# Patient Record
Sex: MALE | Race: BLACK OR AFRICAN AMERICAN | Employment: OTHER | URBAN - METROPOLITAN AREA
[De-identification: names, ages, dates, MRNs, and addresses within clinical notes are randomized per-mention and may not be internally consistent; named-entity substitution may affect disease eponyms.]

---

## 2017-06-12 RX ORDER — LUBIPROSTONE 8 UG/1
8 CAPSULE, GELATIN COATED ORAL 2 TIMES DAILY WITH MEALS
COMMUNITY
End: 2018-03-22

## 2017-06-12 RX ORDER — MULTIVITAMIN
1 TABLET ORAL DAILY
COMMUNITY
End: 2020-09-21

## 2017-06-12 RX ORDER — CYCLOSPORINE 0.5 MG/ML
1 EMULSION OPHTHALMIC 2 TIMES DAILY
COMMUNITY

## 2017-06-12 RX ORDER — POLYETHYLENE GLYCOL 3350 17 G/17G
17 POWDER, FOR SOLUTION ORAL DAILY
COMMUNITY
End: 2021-11-19 | Stop reason: ALTCHOICE

## 2017-06-19 ENCOUNTER — HOSPITAL ENCOUNTER (OUTPATIENT)
Dept: CT IMAGING | Age: 82
Discharge: HOME | End: 2017-06-19
Attending: PHYSICIAN ASSISTANT
Payer: MEDICARE

## 2017-06-19 DIAGNOSIS — R42 VERTIGO: ICD-10-CM

## 2017-06-19 LAB — CREAT UR-MCNC: 1.4 MG/DL (ref 0.6–1.3)

## 2017-06-19 PROCEDURE — 74011636320 HC RX REV CODE- 636/320: Performed by: RADIOLOGY

## 2017-06-19 PROCEDURE — 70470 CT HEAD/BRAIN W/O & W/DYE: CPT

## 2017-06-19 PROCEDURE — 82565 ASSAY OF CREATININE: CPT

## 2017-06-19 RX ADMIN — IOPAMIDOL 70 ML: 612 INJECTION, SOLUTION INTRAVENOUS at 07:59

## 2017-06-21 ENCOUNTER — ANESTHESIA EVENT (OUTPATIENT)
Dept: ENDOSCOPY | Age: 82
End: 2017-06-21
Payer: MEDICARE

## 2017-06-22 ENCOUNTER — ANESTHESIA (OUTPATIENT)
Dept: ENDOSCOPY | Age: 82
End: 2017-06-22
Payer: MEDICARE

## 2017-06-22 ENCOUNTER — HOSPITAL ENCOUNTER (OUTPATIENT)
Age: 82
Setting detail: OUTPATIENT SURGERY
Discharge: HOME | End: 2017-06-22
Attending: INTERNAL MEDICINE | Admitting: INTERNAL MEDICINE
Payer: MEDICARE

## 2017-06-22 VITALS
OXYGEN SATURATION: 98 % | BODY MASS INDEX: 27.2 KG/M2 | WEIGHT: 190 LBS | TEMPERATURE: 97.1 F | RESPIRATION RATE: 12 BRPM | DIASTOLIC BLOOD PRESSURE: 64 MMHG | HEIGHT: 70 IN | SYSTOLIC BLOOD PRESSURE: 121 MMHG | HEART RATE: 77 BPM

## 2017-06-22 LAB
BUN BLD-MCNC: 7 MG/DL (ref 7–18)
CHLORIDE BLD-SCNC: 103 MMOL/L (ref 100–108)
GLUCOSE BLD STRIP.AUTO-MCNC: 137 MG/DL (ref 70–110)
GLUCOSE BLD STRIP.AUTO-MCNC: 145 MG/DL (ref 74–106)
HCT VFR BLD CALC: 34 % (ref 36–49)
HGB BLD-MCNC: 11.6 G/DL (ref 12–16)
POTASSIUM BLD-SCNC: 3.8 MMOL/L (ref 3.5–5.5)
SODIUM BLD-SCNC: 140 MMOL/L (ref 136–145)

## 2017-06-22 PROCEDURE — 76060000031 HC ANESTHESIA FIRST 0.5 HR: Performed by: INTERNAL MEDICINE

## 2017-06-22 PROCEDURE — 77030018846 HC SOL IRR STRL H20 ICUM -A: Performed by: INTERNAL MEDICINE

## 2017-06-22 PROCEDURE — 77030008565 HC TBNG SUC IRR ERBE -B: Performed by: INTERNAL MEDICINE

## 2017-06-22 PROCEDURE — 74011000250 HC RX REV CODE- 250: Performed by: NURSE ANESTHETIST, CERTIFIED REGISTERED

## 2017-06-22 PROCEDURE — 82962 GLUCOSE BLOOD TEST: CPT

## 2017-06-22 PROCEDURE — 76040000019: Performed by: INTERNAL MEDICINE

## 2017-06-22 PROCEDURE — 74011250636 HC RX REV CODE- 250/636

## 2017-06-22 PROCEDURE — 82947 ASSAY GLUCOSE BLOOD QUANT: CPT

## 2017-06-22 PROCEDURE — 74011250636 HC RX REV CODE- 250/636: Performed by: NURSE ANESTHETIST, CERTIFIED REGISTERED

## 2017-06-22 RX ORDER — PROPOFOL 10 MG/ML
INJECTION, EMULSION INTRAVENOUS
Status: DISCONTINUED | OUTPATIENT
Start: 2017-06-22 | End: 2017-06-22 | Stop reason: HOSPADM

## 2017-06-22 RX ORDER — NALOXONE HYDROCHLORIDE 0.4 MG/ML
0.1 INJECTION, SOLUTION INTRAMUSCULAR; INTRAVENOUS; SUBCUTANEOUS AS NEEDED
Status: CANCELLED | OUTPATIENT
Start: 2017-06-22

## 2017-06-22 RX ORDER — SODIUM CHLORIDE, SODIUM LACTATE, POTASSIUM CHLORIDE, CALCIUM CHLORIDE 600; 310; 30; 20 MG/100ML; MG/100ML; MG/100ML; MG/100ML
50 INJECTION, SOLUTION INTRAVENOUS CONTINUOUS
Status: DISCONTINUED | OUTPATIENT
Start: 2017-06-22 | End: 2017-06-22 | Stop reason: HOSPADM

## 2017-06-22 RX ORDER — INSULIN LISPRO 100 [IU]/ML
INJECTION, SOLUTION INTRAVENOUS; SUBCUTANEOUS ONCE
Status: CANCELLED | OUTPATIENT
Start: 2017-06-22 | End: 2017-06-22

## 2017-06-22 RX ORDER — SODIUM CHLORIDE 0.9 % (FLUSH) 0.9 %
5-10 SYRINGE (ML) INJECTION AS NEEDED
Status: CANCELLED | OUTPATIENT
Start: 2017-06-22

## 2017-06-22 RX ORDER — DEXTROSE 50 % IN WATER (D50W) INTRAVENOUS SYRINGE
25-50 AS NEEDED
Status: CANCELLED | OUTPATIENT
Start: 2017-06-22

## 2017-06-22 RX ORDER — PROPOFOL 10 MG/ML
INJECTION, EMULSION INTRAVENOUS AS NEEDED
Status: DISCONTINUED | OUTPATIENT
Start: 2017-06-22 | End: 2017-06-22 | Stop reason: HOSPADM

## 2017-06-22 RX ORDER — ONDANSETRON 2 MG/ML
4 INJECTION INTRAMUSCULAR; INTRAVENOUS
Status: CANCELLED | OUTPATIENT
Start: 2017-06-22

## 2017-06-22 RX ORDER — LIDOCAINE HYDROCHLORIDE 10 MG/ML
0.1 INJECTION, SOLUTION EPIDURAL; INFILTRATION; INTRACAUDAL; PERINEURAL AS NEEDED
Status: DISCONTINUED | OUTPATIENT
Start: 2017-06-22 | End: 2017-06-22 | Stop reason: HOSPADM

## 2017-06-22 RX ORDER — SODIUM CHLORIDE, SODIUM LACTATE, POTASSIUM CHLORIDE, CALCIUM CHLORIDE 600; 310; 30; 20 MG/100ML; MG/100ML; MG/100ML; MG/100ML
100 INJECTION, SOLUTION INTRAVENOUS CONTINUOUS
Status: CANCELLED | OUTPATIENT
Start: 2017-06-22

## 2017-06-22 RX ORDER — MAGNESIUM SULFATE 100 %
4 CRYSTALS MISCELLANEOUS AS NEEDED
Status: CANCELLED | OUTPATIENT
Start: 2017-06-22

## 2017-06-22 RX ADMIN — PROPOFOL 75 MCG/KG/MIN: 10 INJECTION, EMULSION INTRAVENOUS at 08:21

## 2017-06-22 RX ADMIN — SODIUM CHLORIDE, SODIUM LACTATE, POTASSIUM CHLORIDE, AND CALCIUM CHLORIDE 50 ML/HR: 600; 310; 30; 20 INJECTION, SOLUTION INTRAVENOUS at 07:48

## 2017-06-22 RX ADMIN — PROPOFOL 20 MG: 10 INJECTION, EMULSION INTRAVENOUS at 08:19

## 2017-06-22 RX ADMIN — FAMOTIDINE 20 MG: 10 INJECTION, SOLUTION INTRAVENOUS at 07:56

## 2017-06-22 RX ADMIN — PROPOFOL 50 MG: 10 INJECTION, EMULSION INTRAVENOUS at 08:17

## 2017-06-22 NOTE — IP AVS SNAPSHOT
[address] [address] 
[phone] Patient: Rufina Viveros MRN: [id number] :3/26/1927 You are allergic to the following No active allergies Recent Documentation Height Weight BMI Smoking Status 1.778 m 86.2 kg 27.26 kg/m2 Former Smoker Emergency Contacts Name Discharge Info Relation Home Work Mobile Zuleyma Koehler DISCHARGE CAREGIVER [3] Spouse [3] [phone] About your hospitalization You were admitted on:  2017 You last received care in the:  [facility] PACU You were discharged on:  2017 Unit phone number:  [phone] Why you were hospitalized Your primary diagnosis was:  Not on File Providers Seen During Your Hospitalizations Provider Role Specialty Primary office phone Jesus Castillo MD Attending Provider Gastroenterology [phone] Your Primary Care Physician (PCP) Primary Care Physician Office Phone Office Fax [phone] [facility] [place], [address]  [phone] Follow-up Information Follow up With Details Comments Contact Info ARIANA Sol   [address] [place] [address] [address] [facility] [address] 
[phone] Jesus Castillo MD  Follow up as needed 8 [facility] 200 200 [facility] 
[phone] Your Appointments 2017  9:10 AM EDT Nurse Visit with [facility] NURSE [facility] ([facility]) Viridiana Michaels [address] Luma Gonzalez  
[phone] Current Discharge Medication List  
  
CONTINUE these medications which have NOT CHANGED Dose & Instructions Dispensing Information Comments Morning Noon Evening Bedtime AMITIZA 8 mcg capsule Generic drug:  lubiPROStone Your last dose was: Your next dose is:    
   
   
 Dose:  8 mcg Take 8 mcg by mouth two (2) times daily (with meals). Refills:  0  
     
   
   
   
  
 aspirin 325 mg tablet Commonly known as:  ASPIRIN Your last dose was: Your next dose is:    
   
   
 Dose:  325 mg Take 325 mg by mouth daily. Refills:  0  
     
   
   
   
  
 BIOFLEX 095-53-06-77 mg Tab Generic drug:  vit [id number] Your last dose was: Your next dose is: Take  by mouth. Refills:  0 CINNAMON 500 mg Cap Generic drug:  cinnamon bark Your last dose was: Your next dose is:    
   
   
 Dose:  1 Tab Take 1 Tab by mouth daily. Refills:  0 MIRALAX 17 gram/dose powder Generic drug:  polyethylene glycol Your last dose was: Your next dose is:    
   
   
 Dose:  17 g Take 17 g by mouth daily. Refills:  0  
     
   
   
   
  
 multivitamin tablet Commonly known as:  ONE A DAY Your last dose was: Your next dose is:    
   
   
 Dose:  1 Tab Take 1 Tab by mouth daily. Refills:  0  
     
   
   
   
  
 pravastatin 40 mg tablet Commonly known as:  PRAVACHOL Your last dose was: Your next dose is:    
   
   
 Dose:  40 mg Take 40 mg by mouth nightly. Refills:  0  
     
   
   
   
  
 psyllium packet Commonly known as:  METAMUCIL Your last dose was: Your next dose is:    
   
   
 Dose:  1 Packet Take 1 Packet by mouth daily. Refills:  0  
     
   
   
   
  
 raNITIdine hcl 150 mg capsule Your last dose was: Your next dose is:    
   
   
 Dose:  150 mg Take 150 mg by mouth two (2) times a day. Refills:  0  
     
   
   
   
  
 RESTASIS 0.05 % ophthalmic emulsion Generic drug:  cycloSPORINE Your last dose was: Your next dose is:    
   
   
 Dose:  1 Drop Administer 1 Drop to both eyes two (2) times a day. Refills:  0 TYLENOL ARTHRITIS PO Your last dose was: Your next dose is:    
   
   
 Dose:  2 Tab Take 2 Tabs by mouth daily. Refills:  0  
     
   
   
   
  
 VITAMIN C PO Your last dose was: Your next dose is: Take  by mouth. Refills:  0  
     
   
   
   
  
 VITAMIN D3 1,000 unit Cap Generic drug:  cholecalciferol Your last dose was: Your next dose is: Take  by mouth. Refills:  0 Vytorin 10/80 10-80 mg per tablet Generic drug:  ezetimibe-simvastatin Your last dose was: Your next dose is:    
   
   
 Dose:  1 Tab Take 1 Tab by mouth nightly. Refills:  0 Discharge Instructions Colonoscopy: What to Expect at [facility] Your Recovery After you have a colonoscopy, you will stay at the clinic for 1 to 2 hours until the medicines wear off. Then you can go home. But you will need to arrange for a ride. Your doctor will tell you when you can eat and do your other usual activities. Your doctor will talk to you about when you will need your next colonoscopy. Your doctor can help you decide how often you need to be checked. This will depend on the results of your test and your risk for colorectal cancer. After the test, you may be bloated or have gas pains. You may need to pass gas. If a biopsy was done or a polyp was removed, you may have streaks of blood in your stool (feces) for a few days. This care sheet gives you a general idea about how long it will take for you to recover. But each person recovers at a different pace. Follow the steps below to get better as quickly as possible. How can you care for yourself at home? Activity · Rest when you feel tired. · You can do your normal activities when it feels okay to do so. Diet · Follow your doctor's directions for eating. · Unless your doctor has told you not to, drink plenty of fluids.  This helps to replace the fluids that were lost during the colon prep. · Do not drink alcohol. Medicines · Your doctor will tell you if and when you can restart your medicines. He or she will also give you instructions about taking any new medicines. · If you take blood thinners, such as warfarin (Coumadin), clopidogrel (Plavix), or aspirin, be sure to talk to your doctor. He or she will tell you if and when to start taking those medicines again. Make sure that you understand exactly what your doctor wants you to do. · If polyps were removed or a biopsy was done during the test, your doctor may tell you not to take aspirin or other anti-inflammatory medicines for a few days. These include ibuprofen (Advil, Motrin) and naproxen (Aleve). Other instructions · For your safety, do not drive or operate machinery until the medicine wears off and you can think clearly. Your doctor may tell you not to drive or operate machinery until the day after your test. 
· Do not sign legal documents or make major decisions until the medicine wears off and you can think clearly. The anesthesia can make it hard for you to fully understand what you are agreeing to. Follow-up care is a key part of your treatment and safety. Be sure to make and go to all appointments, and call your doctor if you are having problems. It's also a good idea to know your test results and keep a list of the medicines you take. When should you call for help? Call 911 anytime you think you may need emergency care. For example, call if: 
· You passed out (lost consciousness). · You pass maroon or bloody stools. · You have severe belly pain. Call your doctor now or seek immediate medical care if: 
· Your stools are black and tarlike. · Your stools have streaks of blood, but you did not have a biopsy or any polyps removed. · You have belly pain, or your belly is swollen and firm. · You vomit. · You have a fever. · You are very dizzy. Watch closely for changes in your health, and be sure to contact your doctor if you have any problems. Where can you learn more? Go to http://leeanne-adelaide.info/. Enter E264 in the search box to learn more about \"Colonoscopy: What to Expect at Home. \" Current as of: August 9, 2016 Content Version: 11.3 © 7371-2414 [employer]. Care instructions adapted under license by [employer] (which disclaims liability or warranty for this information). If you have questions about a medical condition or this instruction, always ask your healthcare professional. [address] [address] any warranty or liability for your use of this information. Learning About Diverticulosis and Diverticulitis What are diverticulosis and diverticulitis? In diverticulosis and diverticulitis, pouches called diverticula form in the wall of the large intestine, or colon. · In diverticulosis, the pouches do not cause any pain or other symptoms. · In diverticulitis, the pouches get inflamed or infected and cause symptoms. Doctors aren't sure what causes these pouches in the colon. But they think that a low-fiber diet may play a role. Without fiber to add bulk to the stool, the colon has to work harder than normal to push the stool forward. The pressure from this may cause pouches to form in weak spots along the colon. Some people with diverticulosis get diverticulitis. But experts don't know why this happens. What are the symptoms? · In diverticulosis, most people don't have symptoms. But pouches sometimes bleed. · In diverticulitis, symptoms may last from a few hours to a week or more. They include: ¨ Belly pain. This is usually in the lower left side. It is sometimes worse when you move. This is the most common symptom. ¨ Fever and chills. ¨ Bloating and gas. ¨ Diarrhea or constipation. ¨ Nausea and sometimes vomiting. ¨ Not feeling like eating. How can you prevent these problems? You may be able to lower your chance of getting diverticulitis. You can do this by taking steps to prevent constipation. · Eat fruits, vegetables, beans, and whole grains every day. These foods are high in fiber. · Drink plenty of fluids (enough so that your urine is light yellow or clear like water). If you have kidney, heart, or liver disease and have to limit fluids, talk with your doctor before you increase the amount of fluids you drink. · Get at least 30 minutes of exercise on most days of the week. Walking is a good choice. You also may want to do other activities, such as running, swimming, cycling, or playing tennis or team sports. · Take a fiber supplement, such as Citrucel or Metamucil, every day if needed. Read and follow all instructions on the label. · Schedule time each day for a bowel movement. Having a daily routine may help. Take your time and do not strain when having a bowel movement. Some people avoid nuts, seeds, berries, and popcorn. They believe that these foods might get trapped in the diverticula and cause pain. But there is no proof that these foods cause diverticulitis or make it worse. How are these problems treated? · The best way to treat diverticulosis is to avoid constipation. (See the tips above.) · Treatment for diverticulitis includes antibiotics and often a change in your diet. You may need only liquids at first. Your doctor may suggest pain medicines for pain or belly cramps. In some cases, surgery may be needed. Follow-up care is a key part of your treatment and safety. Be sure to make and go to all appointments, and call your doctor if you are having problems. It's also a good idea to know your test results and keep a list of the medicines you take. Where can you learn more? Go to http://leeanne-adelaide.info/. Enter G665 in the search box to learn more about \"Learning About Diverticulosis and Diverticulitis. \" 
 Current as of: August 9, 2016 Content Version: 11.3 © 3357-3465 [employer]. Care instructions adapted under license by logolineup (which disclaims liability or warranty for this information). If you have questions about a medical condition or this instruction, always ask your healthcare professional. [address] any warranty or liability for your use of this information. DISCHARGE SUMMARY from Nurse The following personal items are in your possession at time of discharge: 
 
Dental Appliances: Uppers Visual Aid: Glasses PATIENT INSTRUCTIONS: 
 
 
F-face looks uneven A-arms unable to move or move unevenly S-speech slurred or non-existent T-time-call 911 as soon as signs and symptoms begin-DO NOT go Back to bed or wait to see if you get better-TIME IS BRAIN. Warning Signs of HEART ATTACK Call 911 if you have these symptoms: 
? Chest discomfort. Most heart attacks involve discomfort in the center of the chest that lasts more than a few minutes, or that goes away and comes back. It can feel like uncomfortable pressure, squeezing, fullness, or pain. ? Discomfort in other areas of the upper body. Symptoms can include pain or discomfort in one or both arms, the back, neck, jaw, or stomach. ? Shortness of breath with or without chest discomfort. ? Other signs may include breaking out in a cold sweat, nausea, or lightheadedness. Don't wait more than five minutes to call [address]! Fast action can save your life. Calling 911 is almost always the fastest way to get lifesaving treatment. Emergency Medical Services staff can begin treatment when they arrive  up to an hour sooner than if someone gets to the hospital by car. The discharge information has been reviewed with the patient and spouse. The patient and spouse verbalized understanding. Discharge medications reviewed with the patient and spouse and appropriate educational materials and side effects teaching were provided. Discharge Orders None Introducing [facility]! Dear Eusebio Hickman: Thank you for requesting a [employer] account. Our records indicate that you already have an active [employer] account. You can access your account anytime at [email or web address] Did you know that you can access your hospital and ER discharge instructions at any time in [employer]? You can also review all of your test results from your hospital stay or ER visit. Additional Information If you have questions, please visit the Frequently Asked Questions section of the [employer] website at [email or web address] Remember, [employer] is NOT to be used for urgent needs. For medical emergencies, dial 911. Now available from your iPhone and Android! General Information Please provide this summary of care documentation to your next provider. Patient Signature:  ____________________________________________________________ Date:  ____________________________________________________________  
  
Gabriele Zaldivar Provider Signature:  ____________________________________________________________ Date:  ____________________________________________________________

## 2017-06-22 NOTE — ANESTHESIA POSTPROCEDURE EVALUATION
Post-Anesthesia Evaluation and Assessment    Patient: Frantz Valencia MRN: [id number]  SSN: [id number]    YOB: 1927  Age: 80 y.o. Sex: male       Cardiovascular Function/Vital Signs  Visit Vitals    /56    Pulse 77    Temp 36.4 °C (97.6 °F)    Resp 15    Ht 5' 10\" (1.778 m)    Wt 86.2 kg (190 lb)    SpO2 99%    BMI 27.26 kg/m2       Patient is status post MAC anesthesia for Procedure(s):  COLONOSCOPY. Nausea/Vomiting: None    Postoperative hydration reviewed and adequate. Pain:  Pain Scale 1: Numeric (0 - 10) (06/22/17 0844)  Pain Intensity 1: 0 (06/22/17 0844)   Managed    Neurological Status: At baseline    Mental Status and Level of Consciousness: Arousable    Pulmonary Status:   O2 Device: Room air (06/22/17 0844)   Adequate oxygenation and airway patent    Complications related to anesthesia: None    Post-anesthesia assessment completed.  No concerns    Signed By: Val Rico MD     June 22, 2017

## 2017-06-22 NOTE — H&P
[facility]   [email or web address]      Impression:   1.blood in stool   2. Hx of polyps       Plan:     1. [place] mac all risks discussed       Chief Complaint: blod in stool       HPI:  Almaraz Meals is a 80 y.o. male who is being seen on consult for blood in stool and polyps   PMH:   Past Medical History:   Diagnosis Date    Diabetes ([facility] [address]     no meds    Hypercholesteremia     Personal history of malignant neoplasm of prostate     Prostate cancer ([facility] [address]     Unspecified cerebral artery occlusion with cerebral infarction 2012    \"TIA\"       PSH:   Past Surgical History:   Procedure Laterality Date    HX COLONOSCOPY      HX CRYOABLATION OF THE PROSTATE         Social HX:   Social History     Social History    Marital status:      Spouse name: N/A    Number of children: N/A    Years of education: N/A     Occupational History    Not on file. Social History Main Topics    Smoking status: Former Smoker    Smokeless tobacco: Never Used    Alcohol use No    Drug use: No    Sexual activity: Not on file     Other Topics Concern    Not on file     Social History Narrative       FHX:   History reviewed. No pertinent family history. Allergy:   No Known Allergies    Home Medications:     Prescriptions Prior to Admission   Medication Sig    lubiPROStone (AMITIZA) 8 mcg capsule Take 8 mcg by mouth two (2) times daily (with meals).  psyllium (METAMUCIL) packet Take 1 Packet by mouth daily.  polyethylene glycol (MIRALAX) 17 gram/dose powder Take 17 g by mouth daily.  cycloSPORINE (RESTASIS) 0.05 % ophthalmic emulsion Administer 1 Drop to both eyes two (2) times a day.  cinnamon bark (CINNAMON) 500 mg cap Take 1 Tab by mouth daily.  Cholecalciferol, Vitamin D3, (VITAMIN D3) 1,000 unit Cap Take  by mouth.  vit z-gajswfk-tdgt-rutin-hb111 (BIOFLEX) 538-71-38-53 mg Tab Take  by mouth.       ACETAMINOPHEN (TYLENOL ARTHRITIS PO) Take 2 Tabs by mouth daily.  aspirin (ASPIRIN) 325 mg tablet Take 325 mg by mouth daily.  multivitamin (ONE A DAY) tablet Take 1 Tab by mouth daily.  ASCORBATE CALCIUM (VITAMIN C PO) Take  by mouth.  pravastatin (PRAVACHOL) 40 mg tablet Take 40 mg by mouth nightly.  ranitidine hcl 150 mg capsule Take 150 mg by mouth two (2) times a day.  ezetimibe-simvastatin (VYTORIN 10/80) 10-80 mg per tablet Take 1 Tab by mouth nightly. Review of Systems:     Constitutional: No fevers, chills, weight loss, fatigue. Skin: No rashes, pruritis, jaundice, ulcerations, erythema. HENT: No headaches, nosebleeds, sinus pressure, rhinorrhea, sore throat. Eyes: No visual changes, blurred vision, eye pain, photophobia, jaundice. Cardiovascular: No chest pain, heart palpitations. Respiratory: No cough, SOB, wheezing, chest discomfort, orthopnea. Gastrointestinal: No pain or bleeding    Genitourinary: No dysuria, bleeding, discharge, pyuria. Musculoskeletal: No weakness, arthralgias, wasting. Endo: No sweats. Heme: No bruising, easy bleeding. Allergies: As noted. Neurological: Cranial nerves intact. Alert and oriented. Gait not assessed. Psychiatric:  No anxiety, depression, hallucinations. Visit Vitals    Ht 5' 10\" (1.778 m)    Wt 86.2 kg (190 lb)    BMI 27.26 kg/m2       Physical Assessment:     constitutional: appearance: well developed, well nourished, normal habitus, no deformities, in no acute distress. skin: inspection: no rashes, ulcers, icterus or other lesions; no clubbing or telangiectasias. palpation: no induration or subcutaneos nodules. eyes: inspection: normal conjunctivae and lids; no jaundice pupils: symmetrical, normoreactive to light, normal accommodation and size. ENMT: mouth: normal oral mucosa,lips and gums; good dentition. oropharynx: normal tongue, hard and soft palate; posterior pharynx without erythema, exudate or lesions.    neck: no masses organomegaly or tenderness. respiratory: effort: normal chest excursion; no intercostal retraction or accessory muscle use. cardiovascular: abdominal aorta: normal size and position; no bruits. palpation: PMI of normal size and position; normal rhythm; no thrill or murmurs. abdominal: abdomen: normal consistency; no tenderness or masses. hernias: no hernias appreciated. liver: normal size and consistency. spleen: not palpable. rectal: hemoccult/guaiac: not performed. musculoskeletal: no deformities or muscle wasting   lymphatic: axilae: not palpable. groin: not palpable. neck: within normal limits. other: not palpable. neurologic: cranial nerves: II-XII normal.   psychiatric: judgement/insight: within normal limits. memory: within normal limits for recent and remote events. mood and affect: no evidence of depression, anxiety or agitation. orientation: oriented to time, space and person. Basic Metabolic Profile   No results for input(s): NA, K, CL, CO2, BUN, GLU, CA, MG, PHOS in the last 72 hours. No lab exists for component: CREAT      CBC w/Diff    No results for input(s): WBC, RBC, HGB, HCT, MCV, MCH, MCHC, RDW, PLT, HGBEXT, HCTEXT, PLTEXT in the last 72 hours. No lab exists for component: MPV No results for input(s): GRANS, LYMPH, EOS, PRO, MYELO, METAS, BLAST in the last 72 hours. No lab exists for component: MONO, BASO     Hepatic Function   No results for input(s): ALB, TP, TBILI, GPT, SGOT, AP, AML, LPSE in the last 72 hours. No lab exists for component: Madai Shankar MD, M.D. [facility], [address] [place] [address]  [email or web address]

## 2017-06-22 NOTE — ANESTHESIA PREPROCEDURE EVALUATION
Anesthetic History   No history of anesthetic complications            Review of Systems / Medical History  Patient summary reviewed and pertinent labs reviewed    Pulmonary  Within defined limits                 Neuro/Psych         TIA     Cardiovascular                  Exercise tolerance: <4 METS: Uses a cane     GI/Hepatic/Renal                Endo/Other    Diabetes (On no meds)    Arthritis and cancer (Prostate)     Other Findings   Comments:   Risk Factors for Postoperative nausea/vomiting:       History of postoperative nausea/vomiting? NO       Female? NO       Motion sickness? NO       Intended opioid administration for postoperative analgesia? NO      Smoking Abstinence  Current Smoker? NO  Elective Surgery? YES  Seen preoperatively by anesthesiologist or proxy prior to day of surgery? YES  Pt abstained from smoking 24 hours prior to anesthesia?  N/A           Physical Exam    Airway  Mallampati: II  TM Distance: 4 - 6 cm  Neck ROM: normal range of motion   Mouth opening: Normal     Cardiovascular  Regular rate and rhythm,  S1 and S2 normal,  no murmur, click, rub, or gallop             Dental    Dentition: Edentulous     Pulmonary  Breath sounds clear to auscultation               Abdominal  GI exam deferred       Other Findings            Anesthetic Plan    ASA: 3  Anesthesia type: MAC          Induction: Intravenous  Anesthetic plan and risks discussed with: Patient

## 2017-06-22 NOTE — DISCHARGE INSTRUCTIONS
Colonoscopy: What to Expect at [address] [place] [address]  After you have a colonoscopy, you will stay at the clinic for 1 to 2 hours until the medicines wear off. Then you can go home. But you will need to arrange for a ride. Your doctor will tell you when you can eat and do your other usual activities. Your doctor will talk to you about when you will need your next colonoscopy. Your doctor can help you decide how often you need to be checked. This will depend on the results of your test and your risk for colorectal cancer. After the test, you may be bloated or have gas pains. You may need to pass gas. If a biopsy was done or a polyp was removed, you may have streaks of blood in your stool (feces) for a few days. This care sheet gives you a general idea about how long it will take for you to recover. But each person recovers at a different pace. Follow the steps below to get better as quickly as possible. How can you care for yourself at home? Activity  · Rest when you feel tired. · You can do your normal activities when it feels okay to do so. Diet  · Follow your doctor's directions for eating. · Unless your doctor has told you not to, drink plenty of fluids. This helps to replace the fluids that were lost during the colon prep. · Do not drink alcohol. Medicines  · Your doctor will tell you if and when you can restart your medicines. He or she will also give you instructions about taking any new medicines. · If you take blood thinners, such as warfarin (Coumadin), clopidogrel (Plavix), or aspirin, be sure to talk to your doctor. He or she will tell you if and when to start taking those medicines again. Make sure that you understand exactly what your doctor wants you to do. · If polyps were removed or a biopsy was done during the test, your doctor may tell you not to take aspirin or other anti-inflammatory medicines for a few days. These include ibuprofen (Advil, Motrin) and naproxen (Aleve).   Other instructions  · For your safety, do not drive or operate machinery until the medicine wears off and you can think clearly. Your doctor may tell you not to drive or operate machinery until the day after your test.  · Do not sign legal documents or make major decisions until the medicine wears off and you can think clearly. The anesthesia can make it hard for you to fully understand what you are agreeing to. Follow-up care is a key part of your treatment and safety. Be sure to make and go to all appointments, and call your doctor if you are having problems. It's also a good idea to know your test results and keep a list of the medicines you take. When should you call for help? Call 911 anytime you think you may need emergency care. For example, call if:  · You passed out (lost consciousness). · You pass maroon or bloody stools. · You have severe belly pain. Call your doctor now or seek immediate medical care if:  · Your stools are black and tarlike. · Your stools have streaks of blood, but you did not have a biopsy or any polyps removed. · You have belly pain, or your belly is swollen and firm. · You vomit. · You have a fever. · You are very dizzy. Watch closely for changes in your health, and be sure to contact your doctor if you have any problems. Where can you learn more? Go to http://leeanne-adelaide.info/. Enter E264 in the search box to learn more about \"Colonoscopy: What to Expect at Home. \"  Current as of: August 9, 2016  Content Version: 11.3  © 5275-9734 [employer]. Care instructions adapted under license by [employer] (which disclaims liability or warranty for this information). If you have questions about a medical condition or this instruction, always ask your healthcare professional. [address] any warranty or liability for your use of this information.   Learning About Diverticulosis and Diverticulitis  What are diverticulosis and diverticulitis? In diverticulosis and diverticulitis, pouches called diverticula form in the wall of the large intestine, or colon. · In diverticulosis, the pouches do not cause any pain or other symptoms. · In diverticulitis, the pouches get inflamed or infected and cause symptoms. Doctors aren't sure what causes these pouches in the colon. But they think that a low-fiber diet may play a role. Without fiber to add bulk to the stool, the colon has to work harder than normal to push the stool forward. The pressure from this may cause pouches to form in weak spots along the colon. Some people with diverticulosis get diverticulitis. But experts don't know why this happens. What are the symptoms? · In diverticulosis, most people don't have symptoms. But pouches sometimes bleed. · In diverticulitis, symptoms may last from a few hours to a week or more. They include:  ¨ Belly pain. This is usually in the lower left side. It is sometimes worse when you move. This is the most common symptom. ¨ Fever and chills. ¨ Bloating and gas. ¨ Diarrhea or constipation. ¨ Nausea and sometimes vomiting. ¨ Not feeling like eating. How can you prevent these problems? You may be able to lower your chance of getting diverticulitis. You can do this by taking steps to prevent constipation. · Eat fruits, vegetables, beans, and whole grains every day. These foods are high in fiber. · Drink plenty of fluids (enough so that your urine is light yellow or clear like water). If you have kidney, heart, or liver disease and have to limit fluids, talk with your doctor before you increase the amount of fluids you drink. · Get at least 30 minutes of exercise on most days of the week. Walking is a good choice. You also may want to do other activities, such as running, swimming, cycling, or playing tennis or team sports. · Take a fiber supplement, such as Citrucel or Metamucil, every day if needed.  Read and follow all instructions on the label. · Schedule time each day for a bowel movement. Having a daily routine may help. Take your time and do not strain when having a bowel movement. Some people avoid nuts, seeds, berries, and popcorn. They believe that these foods might get trapped in the diverticula and cause pain. But there is no proof that these foods cause diverticulitis or make it worse. How are these problems treated? · The best way to treat diverticulosis is to avoid constipation. (See the tips above.)  · Treatment for diverticulitis includes antibiotics and often a change in your diet. You may need only liquids at first. Your doctor may suggest pain medicines for pain or belly cramps. In some cases, surgery may be needed. Follow-up care is a key part of your treatment and safety. Be sure to make and go to all appointments, and call your doctor if you are having problems. It's also a good idea to know your test results and keep a list of the medicines you take. Where can you learn more? Go to http://leeanne-adelaide.info/. Enter L695 in the search box to learn more about \"Learning About Diverticulosis and Diverticulitis. \"  Current as of: August 9, 2016  Content Version: 11.3  © 4327-5552 [employer]. Care instructions adapted under license by [employer] (which disclaims liability or warranty for this information). If you have questions about a medical condition or this instruction, always ask your healthcare professional. [place] [address] any warranty or liability for your use of this information.     DISCHARGE SUMMARY from Nurse    The following personal items are in your possession at time of discharge:    Dental Appliances: Uppers  Visual Aid: Glasses                  PATIENT INSTRUCTIONS:    After general anesthesia or intravenous sedation, for 24 hours or while taking prescription Narcotics:  · Limit your activities  · Do not drive and operate hazardous machinery  · Do not make important personal or business decisions  · Do  not drink alcoholic beverages  · If you have not urinated within 8 hours after discharge, please contact your surgeon on call. Report the following to your surgeon:  · Excessive pain, swelling, redness or odor of or around the surgical area  · Temperature over 100.5  · Nausea and vomiting lasting longer than 4 hours or if unable to take medications  · Any signs of decreased circulation or nerve impairment to extremity: change in color, persistent  numbness, tingling, coldness or increase pain  · Any questions    *  Please give a list of your current medications to your Primary Care Provider. *  Please update this list whenever your medications are discontinued, doses are      changed, or new medications (including over-the-counter products) are added. *  Please carry medication information at all times in case of emergency situations. These are general instructions for a healthy lifestyle:    No smoking/ No tobacco products/ Avoid exposure to second hand smoke    Surgeon General's Warning:  Quitting smoking now greatly reduces serious risk to your health. Obesity, smoking, and sedentary lifestyle greatly increases your risk for illness    A healthy diet, regular physical exercise & weight monitoring are important for maintaining a healthy lifestyle    You may be retaining fluid if you have a history of heart failure or if you experience any of the following symptoms:  Weight gain of 3 pounds or more overnight or 5 pounds in a week, increased swelling in our hands or feet or shortness of breath while lying flat in bed. Please call your doctor as soon as you notice any of these symptoms; do not wait until your next office visit.     Recognize signs and symptoms of STROKE:    F-face looks uneven    A-arms unable to move or move unevenly    S-speech slurred or non-existent    T-time-call 911 as soon as signs and symptoms begin-DO NOT go       Back to bed or wait to see if you get better-TIME IS BRAIN. Warning Signs of HEART ATTACK     Call 911 if you have these symptoms:   Chest discomfort. Most heart attacks involve discomfort in the center of the chest that lasts more than a few minutes, or that goes away and comes back. It can feel like uncomfortable pressure, squeezing, fullness, or pain.  Discomfort in other areas of the upper body. Symptoms can include pain or discomfort in one or both arms, the back, neck, jaw, or stomach.  Shortness of breath with or without chest discomfort.  Other signs may include breaking out in a cold sweat, nausea, or lightheadedness. Don't wait more than five minutes to call 911 - MINUTES MATTER! Fast action can save your life. Calling 911 is almost always the fastest way to get lifesaving treatment. Emergency Medical Services staff can begin treatment when they arrive -- up to an hour sooner than if someone gets to the hospital by car. The discharge information has been reviewed with the patient and spouse. The patient and spouse verbalized understanding. Discharge medications reviewed with the patient and spouse and appropriate educational materials and side effects teaching were provided.

## 2017-06-22 NOTE — PERIOP NOTES
Patient armband removed and shredded    Patient confirmed by two identifiers with discharge instructions prior to being provided to patient and spouse.       Dr. Corine Oakley, personally addressing spouse's concern regarding questions about medications

## 2017-09-14 ENCOUNTER — HOSPITAL ENCOUNTER (OUTPATIENT)
Dept: NUCLEAR MEDICINE | Age: 82
Discharge: HOME | End: 2017-09-14
Attending: UROLOGY
Payer: MEDICARE

## 2017-09-14 ENCOUNTER — HOSPITAL ENCOUNTER (OUTPATIENT)
Dept: CT IMAGING | Age: 82
Discharge: HOME | End: 2017-09-14
Attending: UROLOGY
Payer: MEDICARE

## 2017-09-14 DIAGNOSIS — C61 PROSTATE CANCER (HCC): ICD-10-CM

## 2017-09-14 DIAGNOSIS — R97.20 ELEVATED PSA: ICD-10-CM

## 2017-09-14 PROCEDURE — 74177 CT ABD & PELVIS W/CONTRAST: CPT

## 2017-09-14 PROCEDURE — 78306 BONE IMAGING WHOLE BODY: CPT

## 2017-09-14 PROCEDURE — 74011636320 HC RX REV CODE- 636/320: Performed by: UROLOGY

## 2017-09-14 RX ADMIN — IOPAMIDOL 70 ML: 612 INJECTION, SOLUTION INTRAVENOUS at 09:02

## 2019-09-10 ENCOUNTER — HOSPITAL ENCOUNTER (OUTPATIENT)
Dept: GENERAL RADIOLOGY | Age: 84
Discharge: HOME | End: 2019-09-10
Attending: PHYSICIAN ASSISTANT
Payer: MEDICARE

## 2019-09-10 DIAGNOSIS — R13.10 DYSPHAGIA: ICD-10-CM

## 2019-09-10 DIAGNOSIS — R13.10 DYSPHAGIA, UNSPECIFIED TYPE: ICD-10-CM

## 2019-09-10 PROCEDURE — 74011000255 HC RX REV CODE- 255

## 2019-09-10 PROCEDURE — 74230 X-RAY XM SWLNG FUNCJ C+: CPT

## 2019-09-10 PROCEDURE — 92611 MOTION FLUOROSCOPY/SWALLOW: CPT

## 2019-09-10 RX ADMIN — BARIUM SULFATE 40 ML: 400 PASTE ORAL at 13:31

## 2019-09-10 RX ADMIN — BARIUM SULFATE 700 MG: 700 TABLET ORAL at 13:31

## 2019-09-10 RX ADMIN — BARIUM SULFATE 30 G: 960 POWDER, FOR SUSPENSION ORAL at 13:31

## 2019-09-10 NOTE — PROGRESS NOTES
Outpatient Modified Barium Swallow Evaluation    Patient: Devora Adkins (80 y.o. male)  Date: 9/10/2019  Primary Diagnosis: Dysphagia [R13.10]       Precautions: aspiration       Videofluoroscopy Results  MBS completed with results yielding very min pharyngeal dysphagia (functional for age). Pt tolerated reg solid, puree, thin liquids +/- straw consecutive swallows, and 13 mm Ba pill with thin liquid wash without aspiration/penetration events. Minimally decreased tongue base retraction resulted in premature spillage with all PO. Laryngeal elevation was timely/functional. Minimally decreased pharyngeal motility resulted in vallecular and pyriform sinus stasis which was cleared with second volitional swallow. Rec reg solid diet with thin liquids, aspiration precautions, oral care TID, and meds as tolerated. Results/recommendations d/w pt and pt's wife with verbalized understanding. No further skilled SLP services are indicated at this time. Please re-consult if needed. Recommend:   Reg solid diet with thin liquids  Aspiration precautions  Oral care TID  Meds as tolerated    Video Flouroscopic Procedures  [x] Lateral View   [] A-P View [] Scanned to level of Sternum    [x] Seated at 90 deg.   [] Other:    Presentation:    [x] Spoon   [x] Cup   [x] Straw   [] Syringe   [x] Consecutive Swallows  [] Other:     Consistencies:   [x] Ba+ liquid    [] Ba+ liquid (nectar)    [] Ba+ liquid (honey)    [x] Ba+ puree   [x] Ba+ cookie  [x] 13 mm Ba pill with thin Ba wash    Treatment Techniques Attempted:   [] Head Turn: [] Right [] Left     [] Head Tilt: [] Right [] Left     [] Chin Down:  [] Small Sips/bites:  [] Effortful swallow:  [] Double swallow:  [] Other:    Results  Dysphagia Present:     [x] Yes  [] No    Ratings of Dysphagia:     [x] Mild  [] Moderate  [] Severe    Stages of Breakdown:   [] Oral  [x] Pharyngeal  [] Esophageal    Aspiration:   [] Yes    [x] No  [] At Risk     Cough: [] Yes      [x] No Penetration:   [] Yes    [x] No     Cough: [] Yes      [x] No   [] Flash/trace   [] Mod   [] To Chords          Consistency Aspirated:   Consistency Penetrated:   [] Thin Liquid     [] Thin Liquid  [] Nectar-thick Liquid    [] Nectar-thick Liquid   [] Honey-thick Liquid    [] Honey-thick Liquid   [] Puree      [] Puree  [] Solid      [] Solid    Motility Problems with:  [] Lip Closure:    [] Mastication:   [] Bolus Formation/control:   [] A-P Transport:  [x] Tongue Base Retraction: min  [] Swallow Response (delayed):  [] Velopharyngeal Closure:  [] Pharyngeal Aspirations:  [] Laryngeal Elevation/adduction:  [] Epiglottic Inversion:  [x] Pharyngeal motility/sensation: min  [] Cricopharyngeal Relaxation:  [] Esophageal Peristalsis:  [] Other:    Timing of Aspiration/Penetration:  [] Before Swallow:  [] During Swallow:  [] After Swallow:    Transit Time Delay:  [] >1 Second  Oral  [] >1 Second Pharyngeal  [] >20 Second Esophageal     Residuals:  [x] Vallecula:    [x] Mild  [] Mod  [] Severe  [x] Pyriform Sinus:   [x] Mild  [] Mod  [] Severe  [] Posterior Pharyngeal Wall:  [] Mild  [] Mod  [] Severe    Thank you for this referral.    Tyrell Montoya M.S. CCC-SLP/L  Speech-Language Pathologist

## 2019-12-30 ENCOUNTER — HOSPITAL ENCOUNTER (OUTPATIENT)
Dept: PHYSICAL THERAPY | Age: 84
Discharge: HOME | End: 2019-12-30
Payer: MEDICARE

## 2019-12-30 PROCEDURE — 97110 THERAPEUTIC EXERCISES: CPT

## 2019-12-30 PROCEDURE — 97161 PT EVAL LOW COMPLEX 20 MIN: CPT

## 2019-12-30 PROCEDURE — 97530 THERAPEUTIC ACTIVITIES: CPT

## 2019-12-30 NOTE — PROGRESS NOTES
[facility] [address], [address] [place] [address], [facility] [address]  [phone] fax      Plan of Care/ Statement of Necessity for Physical Therapy Services    Patient name: Tashi Blunt Start of Care: 2019   Referral source: ARIANA Leonard : 3/26/1927    Medical Diagnosis: Weakness [R53.1]  Payor: [place] MEDICARE / Plan: [place] MEDICARE PART A & B / Product Type: Medicare /  Onset Date:2019    Treatment Diagnosis: generalized weakness, gait abnormality   Prior Hospitalization: see medical history Provider#: [id number]   Medications: Verified on Patient summary List    Comorbidities: DM, arthritis, Hearing impaired, Cancer prostate, stroke   Prior Level of Function:  light household activities,stationery bike use,  RW for community activities, no AD in the home, community activities, I with ADLs and activiites      The Plan of Care and following information is based on the information from the initial evaluation. Assessment/ key information: 81 YO male diagnosed as above and with S/S consistent with above diagnosis presents to skilled outpatient PT. CCO LBP and leg weakness, and some difficulty walking. generalized weakness, decreased endurance and activity tolerance, postural deviations. Patient demonstrates the potential to make gains with improved ROM, strength, endurance/activity tolerance, functional FOTO survey score   and all within a reasonable time frame so as to increase their functional independence with ADLs and activities for carryover to  Improved quality of life, tolerance to light household activities and community activities. Patient requires skilled Physical Therapy so as to monitor their response to and modify their treatment plan accordingly. Patient appears to be an appropriate candidate for skilled outpatient Physical Therapy.    Evaluation Complexity History MEDIUM  Complexity : 1-2 comorbidities / personal factors will impact the outcome/ POC ; Examination MEDIUM Complexity : 3 Standardized tests and measures addressing body structure, function, activity limitation and / or participation in recreation  ;Presentation LOW Complexity : Stable, uncomplicated  ;Clinical Decision Making MEDIUM Complexity : FOTO score of 26-74  Overall Complexity Rating: LOW   Problem List: pain affecting function, decrease ROM, decrease strength, impaired gait/ balance, decrease ADL/ functional abilitiies, decrease activity tolerance, decrease flexibility/ joint mobility, decrease transfer abilities and other FOTO 44   Treatment Plan may include any combination of the following: Therapeutic exercise, Therapeutic activities, Neuromuscular re-education, Physical agent/modality, Gait/balance training, Manual therapy, Patient education, Self Care training, Functional mobility training, Home safety training and Stair training  Patient / Family readiness to learn indicated by: asking questions, trying to perform skills and interest  Persons(s) to be included in education: patient (P) and family support person (FSP);list spouse Cloyde Halsted  Barriers to Learning/Limitations: yes;  sensory deficits-vision/hearing/speech  Patient Goal (s): I want to get better, my back, and my legs  Patient Self Reported Health Status: fair  Rehabilitation Potential: good    Short Term Goals: To be accomplished in 5 treatments:   1 patient will have established and be I with HEP to aid with self management at discharge   EVAL issued   CURRENT   2 patient will tolerate 30-45 minutes skilled PT to address generalized strengthening and balance to aid with improving functional mobility and safety with ambulation in the home and community   EVAL initiated skilled PT    CURRENT  Long Term Goals:  To be accomplished in 8 treatments:   1 patient will have overall reports of improvement at 50% to aid with increase tolerance to light household activities   EVAL CCO LBP and leg weakness affecting his functional mobility and tolerance to household activities   CURRENT   2 patient will have FOTO 54 to show significant improvement with performing his usual activities   EVAL 44, quite a bit of difficulty   CURRENT   3 patient will ambulate 570 feet with least restrictive device and no LOB for carryover to increase safety and mobility at home and in the community   EVAL 40-50 feet with RW and forward posture, SBA   CURRENT        Frequency / Duration: Patient to be seen 2 times per week for 8 treatments. Patient/ Caregiver education and instruction: Diagnosis, prognosis, self care, activity modification, brace/ splint application and exercises   [x]  Plan of care has been reviewed with PTA    Certification Period: 12/30/19-1/28/20  Abimael Thania, PT 12/30/2019 5:09 PM    ________________________________________________________________________    I certify that the above Therapy Services are being furnished while the patient is under my care. I agree with the treatment plan and certify that this therapy is necessary.     Physician's Signature:____________Date:_________TIME:________    [employer], Date and Time must be completed for valid certification **    Please sign and return to In . Wendy Ksawerego [address] [place] [address], [address] [place] [address],[facility] [address]  [phone] fax

## 2019-12-30 NOTE — PROGRESS NOTES
PT DAILY TREATMENT NOTE/NEURO EVAL 10-18    Patient Name: Shashank Loredo  Date:2019  : 3/26/1927  [x]  Patient  Verified  Payor: [place] MEDICARE / Plan: [place] MEDICARE PART A & B / Product Type: Medicare /    In time:503  Out time:602  Total Treatment Time (min): 61  Visit #: 1 of 8    Medicare/BCBS Only   Total Timed Codes (min):  25 1:1 Treatment Time:  25     Treatment Area: Weakness [R53.1]    SUBJECTIVE  Pain Level (0-10 scale): 3 LBP and legs, sometimes B shoulders  []constant [x]intermittent []improving []worsening []no change since onset  WORSE  Standing, as the day goes on    Better in the morning, sitting,   Any medication changes, allergies to medications, adverse drug reactions, diagnosis change, or new procedure performed?: [x] No    [] Yes (see summary sheet for update)  Subjective functional status/changes:     PLOF: light household activities,stationery bike use,  RW for community activities, no AD in the home, community activities, I with ADLs and activiites  Limitations to PLOF: generalized weakness, decreased endurance and activity tolerance, postural deviations,   Mechanism of Injury: 2019, insideous onset attributes to age  Current symptoms/Complaints: 79 YO male diagnosed as above and with S/S consistent with above diagnosis presents to skilled outpatient PT. CCO LBP and leg weakness, and some difficulty walking.    Previous Treatment/Compliance: PCP,   PMHx/Surgical Hx: DM, arthritis, Hearing impaired, Cancer prostate, stroke  Work Hx: retired   Living Situation: lives in a 1 story house, no steps to enter, lives with spouse Ilia Huffman  Pt Goals: I want to get better, my back, and my legs  Barriers: [x]pain []financial []time []transportation []other  Motivation: good  Substance use: []Alcohol []Tobacco []other:   FABQ Score: []low []elevate  Cognition: A & O x 4  Other:    OBJECTIVE/EXAMINATION  Domestic Life: light household activities, some walking in the home, community activities  Activity/Recreational Limitations: back pain  Mobility: RW for community activities, no AD in the home  Self Care: I with decrease pace, and some difficulty with walk in shower          34 min [x]Eval                  []Re-Eval       15 min Therapeutic Exercise:  [x] See flow sheet :   Rationale: increase ROM, increase strength, improve coordination, improve balance and increase proprioception to improve the patients ability to tolerate ADLs and activities    10 min Therapeutic Activity:  [x]  See flow sheet :   Rationale: increase ROM, increase strength, improve coordination, improve balance and increase proprioception  to improve the patients ability to tolerate ADLS and activities             With   [x] TE   [x] TA   [] neuro   [] other: Patient Education: [x] Review HEP    [] Progressed/Changed HEP based on:   [] positioning   [] body mechanics   [] transfers   [] heat/ice application    [x] other: POC, back support and knee support application and indications     Other Objective/Functional Measures:  FEAR of Falling, denies falls. Physical Therapy Evaluation  Neurologic    Posture: [x] Poor    [x] Fair    [] Good    Describe: FH, RS, increased kyphosis      Gait: [] Normal    [x] Abnormal    Device:  RW for community      Describe: denies steps and stair ambulation.     ROM:     Assessed in sitting                                 AROM    PROM   Shoulder Left Right Left Right   Flex 90 90     Ext       ABD       ER       IR                AROM    PROM   Knee Left Right Left Right   Ext -15 -15     Flex 115 118               AROM                           PROM  Hip Left Right Left Right   Flex 20 20     Ext       ABD       ER       IR                                                AROM      PROM   Ankle Left Right Left Right    PF [facility]      DF [facility] WFL       Strength (MMT):  Shoulder L (1-5) R (1-5)   Shoulder Flexion 4+ 4+   Shoulder Ext     Shoulder ABD 4+ 4+   Shoulder ADD     Shoulder IR Shoulder ER                                               Hip L (1-5) R (1-5)   Hip Flexion 4+ 4+   Hip Ext     Hip ABD     Hip ADD     Hip ER     Hip IR       Knee L (1-5) R (1-5)   Knee Flexion 4+ 4+   Knee Extension 4+ 4+   Ankle PF     Ankle DF     Other       Tone:normal  Motor Control:normal    Sensation:intact         Balance/ Equilibrium:              Left            Right  Tracks Across Midline X  X   Reaches Across Midline X X         Sitting Balance: Static:  [x] Good    [] Fair    [] Poor     Dynamic:   [x] Good    [] Fair    [] Poor        Standing Balance: Static:   [] Good    [x] Fair    [] Poor     Dynamic:   [] Good    [x] Fair    [] Poor        Protective Extension:  [] Present    [x] Delayed    [] Absent               Functional Mobility      Bed Mobility:      Scooting:        Rolling:       Sit-Supine:I       Transfers:       Sit-Stand:SBA       Floor-Stand:       Gait:       Tandem:       Backwards:       Braiding:      Elevations:       Curbs:      Ramps:      Stairs:    Behavior: [x] Cooperative    [] Impulsive    [] Agitated    [] Perseverative    [] Confused   Oriented x: 4  Cognition: [] One Step Commands   [x] Multiple Commands   [] Displays Neglect [] R  [] L    Other:       Impaired Judgement: [] Y    [x] N      Impaired Vision:  [x] Y    [] N      Safety Awareness Deficits  [] Y    [x] N      Impaired Hearing  [x] Y    [] N      Able to Express Needs [x] Y    [] N    Optional Tests:       Dynamic Gait Index (24pt scale): Functional Gait Assessment (30pt scale):       Flores Balance Scale (56pt scale):     Other test /comments:       Pain Level (0-10 scale) post treatment: 3    ASSESSMENT/Changes in Function: Patient demonstrates the potential to make gains with improved ROM, strength, endurance/activity tolerance, functional FOTO survey score   and all within a reasonable time frame so as to increase their functional independence with ADLs and activities for carryover to  Improved quality of life, tolerance to light household activities and community activities. Patient requires skilled Physical Therapy so as to monitor their response to and modify their treatment plan accordingly. Patient appears to be an appropriate candidate for skilled outpatient Physical Therapy. Patient will continue to benefit from skilled PT services to modify and progress therapeutic interventions, address functional mobility deficits, address ROM deficits, address strength deficits, analyze and address soft tissue restrictions, analyze and cue movement patterns, analyze and modify body mechanics/ergonomics, assess and modify postural abnormalities, address imbalance/dizziness and instruct in home and community integration to attain remaining goals.      [x]  See Plan of Care  []  See progress note/recertification  []  See Discharge Summary         Progress towards goals / Updated goals:       PLAN  [x]  Upgrade activities as tolerated     [x]  Continue plan of care  []  Update interventions per flow sheet       []  Discharge due to:_  []  Other:_      Kimber Pierre, PT 12/30/2019  4:57 PM

## 2020-01-06 ENCOUNTER — HOSPITAL ENCOUNTER (OUTPATIENT)
Dept: PHYSICAL THERAPY | Age: 85
Discharge: HOME | End: 2020-01-06
Payer: MEDICARE

## 2020-01-06 PROCEDURE — 97112 NEUROMUSCULAR REEDUCATION: CPT | Performed by: PHYSICAL THERAPIST

## 2020-01-06 PROCEDURE — 97110 THERAPEUTIC EXERCISES: CPT | Performed by: PHYSICAL THERAPIST

## 2020-01-06 PROCEDURE — 97530 THERAPEUTIC ACTIVITIES: CPT | Performed by: PHYSICAL THERAPIST

## 2020-01-06 NOTE — PROGRESS NOTES
PT DAILY TREATMENT NOTE 10-18    Patient Name: Marcia Ray  Date:2020  : 3/26/1927  [x]  Patient  Verified  Payor: VA MEDICARE / Plan: VA MEDICARE PART A & B / Product Type: Medicare /    In time:10:01A  Out time:10:39A  Total Treatment Time (min): 38min  Visit #: 2 of 8    Medicare/BCBS Only   Total Timed Codes (min):  38 1:1 Treatment Time:  38       Treatment Area: Weakness [R53.1]    SUBJECTIVE  Pain Level (0-10 scale): \"Stiff\"  Any medication changes, allergies to medications, adverse drug reactions, diagnosis change, or new procedure performed?: [x] No    [] Yes (see summary sheet for update)  Subjective functional status/changes:   [] No changes reported  Patient wife reports he only uses the RW in the community, \"forgets it\" at home. OBJECTIVE    15 min Therapeutic Exercise:  [x] See flow sheet :   Rationale: increase ROM and increase strength to improve the patients ability to A/ROM and decrease pain with movement. 14 min Therapeutic Activity:  [x]  See flow sheet :   Rationale: improve coordination, improve balance and increase proprioception  to improve the patients ability to Tolerate basic ADLs and household-related tasks without pain. 9 min Neuromuscular Re-education:  [x]  See flow sheet :   Rationale: improve coordination, improve balance and increase proprioception  to improve the patients ability to perform activities with good form and proprioception with tactile and verbal cuing appropriately.       With   [x] TE   [x] TA   [x] neuro   [] other: Patient Education: [x] Review HEP    [x] Progressed/Changed HEP based on:   [x] positioning   [] body mechanics   [] transfers   [] heat/ice application    [] other:      Other Objective/Functional Measures: Able to perform 15 sit to stands from elevated surface, however had difficulty reaching full stand position by the last 5 repetitions     Pain Level (0-10 scale) post treatment: \"tired\"    ASSESSMENT/Changes in Function: Patient is progressing towards goals of decreased pain and overall increased balance and activity tolerance. Recommend initiating falls preparedness training next session. Patient will continue to benefit from skilled PT services to modify and progress therapeutic interventions, address functional mobility deficits, address ROM deficits, address strength deficits, analyze and address soft tissue restrictions, analyze and cue movement patterns, analyze and modify body mechanics/ergonomics, assess and modify postural abnormalities and instruct in home and community integration to attain remaining goals. [x]  See Plan of Care  []  See progress note/recertification  []  See Discharge Summary         Progress towards goals / Updated goals:  Short Term Goals: To be accomplished in 5 treatments:              1 patient will have established and be I with HEP to aid with self management at discharge              EVAL issued              CURRENT MET - wife reports doing them 1/6/20              2 patient will tolerate 30-45 minutes skilled PT to address generalized strengthening and balance to aid with improving functional mobility and safety with ambulation in the home and community              EVAL initiated skilled PT               CURRENT  Long Term Goals:  To be accomplished in 8 treatments:              1 patient will have overall reports of improvement at 50% to aid with increase tolerance to light household activities              EVAL CCO LBP and leg weakness affecting his functional mobility and tolerance to household activities              CURRENT              2 patient will have FOTO 54 to show significant improvement with performing his usual activities              EVAL 44, quite a bit of difficulty              CURRENT              3 patient will ambulate 570 feet with least restrictive device and no LOB for carryover to increase safety and mobility at home and in the community              EVAL 40-50 feet with RW and forward posture, SBA              CURRENT     PLAN  [x]  Upgrade activities as tolerated     [x]  Continue plan of care  []  Update interventions per flow sheet       []  Discharge due to:_  []  Other:_      Dianne Davison, PT 1/6/2020  11:03 AM    Future Appointments   Date Time Provider Ivis Biggs   1/8/2020  9:30 AM Polina Sadler, PT [facility]   1/15/2020  9:30 AM Polina Sadler, PT [facility]   1/17/2020 10:30 AM Polina Sadler PT [facility]   1/22/2020  9:30 AM Christina Shepard [facility]   1/24/2020  9:00 AM Braden James, LINA [facility]   1/29/2020 10:00 AM Braden James, PT [facility]   3/20/2020  9:10 AM [facility] NURSE [facility]   4/16/2020 11:15 AM Sari Sinclair MD [facility]

## 2020-01-08 ENCOUNTER — HOSPITAL ENCOUNTER (OUTPATIENT)
Dept: PHYSICAL THERAPY | Age: 85
Discharge: HOME | End: 2020-01-08
Payer: MEDICARE

## 2020-01-08 PROCEDURE — 97530 THERAPEUTIC ACTIVITIES: CPT | Performed by: PHYSICAL THERAPIST

## 2020-01-08 PROCEDURE — 97112 NEUROMUSCULAR REEDUCATION: CPT | Performed by: PHYSICAL THERAPIST

## 2020-01-08 PROCEDURE — 97110 THERAPEUTIC EXERCISES: CPT | Performed by: PHYSICAL THERAPIST

## 2020-01-08 NOTE — PROGRESS NOTES
PT DAILY TREATMENT NOTE 10-18    Patient Name: Charles Ashby  Date:2020  : 3/26/1927  [x]  Patient  Verified  Payor: VA MEDICARE / Plan: VA MEDICARE PART A & B / Product Type: Medicare /    In time:9:33A  Out time:10:15A  Total Treatment Time (min): 42min  Visit #: 3 of 8    Medicare/BCBS Only   Total Timed Codes (min):  42 1:1 Treatment Time:  40       Treatment Area: Weakness [R53.1]    SUBJECTIVE  Pain Level (0-10 scale): \"stiff\"  Any medication changes, allergies to medications, adverse drug reactions, diagnosis change, or new procedure performed?: [x] No    [] Yes (see summary sheet for update)  Subjective functional status/changes:   [] No changes reported  Patient wife states he is getting up and moving around a lot more; still gets winded easily but is doing more. OBJECTIVE    20 min Therapeutic Exercise:  [x] See flow sheet :   Rationale: increase ROM and increase strength to improve the patients ability to A/ROM and decrease pain with movement. 12 min Therapeutic Activity:  [x]  See flow sheet :   Rationale: increase strength and improve coordination  to improve the patients ability to Tolerate basic ADLs and household-related tasks without pain. 10 min Neuromuscular Re-education:  [x]  See flow sheet :   Rationale: improve coordination, improve balance and increase proprioception  to improve the patients ability to perform activities with good form and proprioception with tactile and verbal cuing appropriately.           With   [x] TE   [x] TA   [x] neuro   [] other: Patient Education: [x] Review HEP    [] Progressed/Changed HEP based on:   [] positioning   [] body mechanics   [] transfers   [] heat/ice application    [] other:      Other Objective/Functional Measures: Able to stand without support from low surface table 10 repetitions without Upper extremities and without rest break    Pain Level (0-10 scale) post treatment: 0/10, \"stiff\"    ASSESSMENT/Changes in Function: Patient is making steady progress towards goals of decreased pain and increased activity tolerance. Continues to need verbal and tactile cues to maximize safety and overall activity tolerance with functional tasks. Patient will continue to benefit from skilled PT services to modify and progress therapeutic interventions, address functional mobility deficits, address ROM deficits, address strength deficits, analyze and address soft tissue restrictions, analyze and cue movement patterns, analyze and modify body mechanics/ergonomics, assess and modify postural abnormalities and address imbalance/dizziness to attain remaining goals.      [x]  See Plan of Care  []  See progress note/recertification  []  See Discharge Summary         Progress towards goals / Updated goals:  Short Term Goals: To be accomplished in 5 treatments:              1 patient will have established and be I with HEP to aid with self management at discharge              EVAL issued              CURRENT MET - wife reports doing them 1/6/20              2 patient will tolerate 30-45 minutes skilled PT to address generalized strengthening and balance to aid with improving functional mobility and safety with ambulation in the home and community              EVAL initiated skilled PT               CURRENT approaching - can perform about 20 min without rest 1/92020  Long Term Goals: To be accomplished in 8 treatments:              1 patient will have overall reports of improvement at 50% to aid with increase tolerance to light household activities              EVAL CCO LBP and leg weakness affecting his functional mobility and tolerance to household activities  [phone] patient will have FOTO 54 to show significant improvement with performing his usual activities              EVAL 44, quite a bit of difficulty              CURRENT              3 patient will ambulate 570 feet with least restrictive device and no LOB for carryover to increase safety and mobility at home and in the community              EVAL 40-50 feet with RW and forward posture, SBA              CURRENT     PLAN  [x]  Upgrade activities as tolerated     [x]  Continue plan of care  []  Update interventions per flow sheet       []  Discharge due to:_  []  Other:_      Ramesh Arreaga, PT 1/8/2020  10:11 AM    Future Appointments   Date Time Provider Ivis Biggs   1/15/2020  9:30 AM Stanford Solomon, PT [facility] [address]   1/17/2020 10:30 AM Stanford Solomon, PT [facility] [address]   1/22/2020  9:30 AM Eugenia Llanos [facility] [address]   1/24/2020  9:00 AM Wily Benson, PT [facility] [address]   1/29/2020 10:00 AM Wily Benson, PT [facility] [address]   3/20/2020  9:10 AM [facility] NURSE [place] [facility]   4/16/2020 11:15 AM Nga Valentine MD [facility]

## 2020-01-15 ENCOUNTER — HOSPITAL ENCOUNTER (OUTPATIENT)
Dept: PHYSICAL THERAPY | Age: 85
Discharge: HOME | End: 2020-01-15
Payer: MEDICARE

## 2020-01-15 PROCEDURE — 97530 THERAPEUTIC ACTIVITIES: CPT | Performed by: PHYSICAL THERAPIST

## 2020-01-15 PROCEDURE — 97110 THERAPEUTIC EXERCISES: CPT | Performed by: PHYSICAL THERAPIST

## 2020-01-15 NOTE — PROGRESS NOTES
PT DAILY TREATMENT NOTE 10-18    Patient Name: Devante Mack  Date:1/15/2020  : 3/26/1927  [x]  Patient  Verified  Payor: VA MEDICARE / Plan: VA MEDICARE PART A & B / Product Type: Medicare /    In time:9:35A  Out time:10:09A  Total Treatment Time (min): 34min  Visit #: 4 of 8    Medicare/BCBS Only   Total Timed Codes (min):  34 1:1 Treatment Time:  25       Treatment Area: Weakness [R53.1]    SUBJECTIVE  Pain Level (0-10 scale): \"stiff\"  Any medication changes, allergies to medications, adverse drug reactions, diagnosis change, or new procedure performed?: [x] No    [] Yes (see summary sheet for update)  Subjective functional status/changes:   [] No changes reported  Patient is progressing with home program. Wife states he has started riding his stationary bike again for about 5 min and just purchased the weights for home use. OBJECTIVE    18 min Therapeutic Exercise:  [x] See flow sheet :   Rationale: increase ROM and increase strength to improve the patients ability to A/ROM and decrease pain with movement. 16 min Therapeutic Activity:  [x]  See flow sheet :   Rationale: increase strength and improve coordination  to improve the patients ability to Tolerate basic ADLs and job-related tasks without pain. Included stair training and neuromuscular re-education. With   [x] TE   [x] TA   [] neuro   [] other: Patient Education: [x] Review HEP    [x] Progressed/Changed HEP based on:   [] positioning   [] body mechanics   [] transfers   [] heat/ice application    [] other:      Other Objective/Functional Measures: Able to stand NBOS with eyes open on foam without UE use for 30 sec. Pain Level (0-10 scale) post treatment: \"stiff\" 0/10    ASSESSMENT/Changes in Function: Patient is progressing slowly towards goals of increased activity tolerance, improved balance, and overall improved gait stability. Continues to need VC and TC for safety and proper positioning.      Patient will continue to benefit from skilled PT services to modify and progress therapeutic interventions, address functional mobility deficits, address ROM deficits, address strength deficits, analyze and address soft tissue restrictions, analyze and cue movement patterns, analyze and modify body mechanics/ergonomics and assess and modify postural abnormalities to attain remaining goals.      [x]  See Plan of Care  []  See progress note/recertification  []  See Discharge Summary         Progress towards goals / Updated goals:  Short Term Goals: To be accomplished in 5 treatments:              1 patient will have established and be I with HEP to aid with self management at discharge              EVAL issued              CURRENT MET - wife reports doing them 1/6/20, 1/15/2020              2 patient will tolerate 30-45 minutes skilled PT to address generalized strengthening and balance to aid with improving functional mobility and safety with ambulation in the home and community              EVAL initiated skilled PT               CURRENT approaching - can perform about 20 min without rest 1/9/2020, 30 min without rest 1/15/2020  Long Term Goals: To be accomplished in 8 treatments:              1 patient will have overall reports of improvement at 50% to aid with increase tolerance to light household activities              EVAL CCO LBP and leg weakness affecting his functional mobility and tolerance to household activities  [phone] patient will have FOTO 54 to show significant improvement with performing his usual activities              EVAL 44, quite a bit of difficulty              CURRENT              3 patient will ambulate 570 feet with least restrictive device and no LOB for carryover to increase safety and mobility at home and in the community              EVAL 40-50 feet with RW and forward posture, SBA              CURRENT     PLAN  [x]  Upgrade activities as tolerated     [x]  Continue plan of care  [] Update interventions per flow sheet       []  Discharge due to:_  []  Other:_      Mark Ring, PT 1/15/2020  10:18 AM    Future Appointments   Date Time Provider Ivis Biggs   1/17/2020 10:30 AM Doyle Page, PT [facility]   1/22/2020  9:30 AM Alejandra Day [facility]   1/24/2020  9:00 AM [facility], PT [facility]   1/29/2020 10:00 AM [facility], PT [facility]   3/20/2020  9:10 AM [facility] NURSE [facility]   4/16/2020 11:15 AM Miguel Shaffer MD [facility]

## 2020-01-17 ENCOUNTER — HOSPITAL ENCOUNTER (OUTPATIENT)
Dept: PHYSICAL THERAPY | Age: 85
Discharge: HOME | End: 2020-01-17
Payer: MEDICARE

## 2020-01-17 PROCEDURE — 97530 THERAPEUTIC ACTIVITIES: CPT | Performed by: PHYSICAL THERAPIST

## 2020-01-17 PROCEDURE — 97112 NEUROMUSCULAR REEDUCATION: CPT | Performed by: PHYSICAL THERAPIST

## 2020-01-17 PROCEDURE — 97110 THERAPEUTIC EXERCISES: CPT | Performed by: PHYSICAL THERAPIST

## 2020-01-17 NOTE — PROGRESS NOTES
PT DAILY TREATMENT NOTE 10-18    Patient Name: Carlo Swenson  Date:2020  : 3/26/1927  [x]  Patient  Verified  Payor: [place] MEDICARE / Plan: [place] MEDICARE PART A & B / Product Type: Medicare /    In time:10:20A  Out time:11:06  Total Treatment Time (min): 46min  Visit #: 5 of 8    Medicare/BCBS Only   Total Timed Codes (min):  47 1:1 Treatment Time:  47       Treatment Area: Weakness [R53.1]    SUBJECTIVE  Pain Level (0-10 scale): 0/10 but \"stiff\"  Any medication changes, allergies to medications, adverse drug reactions, diagnosis change, or new procedure performed?: [x] No    [] Yes (see summary sheet for update)  Subjective functional status/changes:   [] No changes reported  Patient states his bike at home is a little \"tighter\" than the one in the clinic. Was only able to perform 5 min at home. OBJECTIVE    13 min Therapeutic Exercise:  - See flow sheet :   Rationale: increase ROM and increase strength to improve the patients ability to improve A/ROM and decrease pain with functional movement. 25 min Therapeutic Activity:  [x]  See flow sheet :   Rationale: increase strength and improve coordination  to improve the patients ability to Tolerate basic ADLs and household-related tasks without pain. 8 min Neuromuscular Re-education:  [x]  See flow sheet :   Rationale: improve coordination, improve balance and increase proprioception  to improve the patients ability to improve balance and dynamic gait sequence. With   [x] TE   [x] TA   [x] neuro   [] other: Patient Education: [x] Review HEP    [x] Progressed/Changed HEP based on:   [x] positioning   [] body mechanics   [] transfers   [] heat/ice application    [] other:      Other Objective/Functional Measures: Able to  tandem no UE support on firm surface without LOB for 30 secs.       Pain Level (0-10 scale) post treatment: 0/10 \"stiff\"    ASSESSMENT/Changes in Function: Patient is progressing towards goals of increased activity tolerance, improved balance, and decreased falls risk. Wife states continued intermittent compliance with home program. Skilled services continues to be required for appropriate exercise progression, VC and TC for proper form and positioning during activities and exercises. Patient will continue to benefit from skilled PT services to modify and progress therapeutic interventions, address functional mobility deficits, address ROM deficits, address strength deficits, analyze and address soft tissue restrictions, analyze and cue movement patterns, analyze and modify body mechanics/ergonomics and assess and modify postural abnormalities to attain remaining goals.      [x]  See Plan of Care  []  See progress note/recertification  []  See Discharge Summary         Progress towards goals / Updated goals:  Short Term Goals: To be accomplished in 5 treatments:              1 patient will have established and be I with HEP to aid with self management at discharge              EVAL issued              CURRENT MET - wife reports doing them 1/6/20, 1/15/2020              2 patient will tolerate 30-45 minutes skilled PT to address generalized strengthening and balance to aid with improving functional mobility and safety with ambulation in the home and community              EVAL initiated skilled PT               CURRENT approaching - can perform 40 min without rest on 1/17/2020  Long Term Goals: To be accomplished in 8 treatments:              1 patient will have overall reports of improvement at 50% to aid with increase tolerance to light household activities              EVAL CCO LBP and leg weakness affecting his functional mobility and tolerance to household activities  [phone] patient will have FOTO 54 to show significant improvement with performing his usual activities              EVAL 44, quite a bit of difficulty              CURRENT              3 patient will ambulate 570 feet with least restrictive device and no LOB for carryover to increase safety and mobility at home and in the community              EVAL 40-50 feet with RW and forward posture, SBA              CURRENT     PLAN  [x]  Upgrade activities as tolerated     [x]  Continue plan of care  []  Update interventions per flow sheet       []  Discharge due to:_  []  Other:_      Lisa Vazquez, PT 1/17/2020  10:25 AM    Future Appointments   Date Time Provider Ivis Biggs   1/17/2020 10:30 AM Devyn Lorenzo PT [facility]   1/22/2020  9:30 AM Yue Lucas [facility]   1/24/2020  9:00 AM Brie Durant PT [facility]   1/29/2020 10:00 AM Brie Durant PT [facility]   3/20/2020  9:10 AM [facility] NURSE [facility]   4/16/2020 11:15 AM Temitope Torres MD [facility]

## 2020-01-22 ENCOUNTER — HOSPITAL ENCOUNTER (OUTPATIENT)
Dept: PHYSICAL THERAPY | Age: 85
Discharge: HOME | End: 2020-01-22
Payer: MEDICARE

## 2020-01-22 PROCEDURE — 97112 NEUROMUSCULAR REEDUCATION: CPT

## 2020-01-22 PROCEDURE — 97530 THERAPEUTIC ACTIVITIES: CPT

## 2020-01-22 PROCEDURE — 97110 THERAPEUTIC EXERCISES: CPT

## 2020-01-22 NOTE — PROGRESS NOTES
PT DAILY TREATMENT NOTE 10-18    Patient Name: Adriana Dasilva  Date:2020  : 3/26/1927  [x]  Patient  Verified  Payor: VA MEDICARE / Plan: VA MEDICARE PART A & B / Product Type: Medicare /    In time:927  Out time:1016  Total Treatment Time (min): 49  Visit #: 6 of 8    Medicare/BCBS Only   Total Timed Codes (min):  49 1:1 Treatment Time:  49       Treatment Area: Weakness [R53.1]    SUBJECTIVE  Pain Level (0-10 scale): numbness in back and stiffness  Any medication changes, allergies to medications, adverse drug reactions, diagnosis change, or new procedure performed?: [x] No    [] Yes (see summary sheet for update)  Subjective functional status/changes:   [] No changes reported  Pt reports numbness in back into L LE. His wife reports he has been sore and has not been doing many of the HEP. He presents with FWW. His wife would like to trial [address] OBJECTIVE      12 min Therapeutic Exercise:  [x] See flow sheet :   Rationale: increase ROM, increase strength and education on updated HEP to improve the patients ability to complete functional activitis    22 min Therapeutic Activity:  [x]  See flow sheet :   Rationale: increase ROM, increase strength, improve coordination, increase proprioception and sit to stand transfers  to improve the patients ability to complete to get around home safely with decreased assistance from wife. 15 min Neuromuscular Re-education:  [x]  See flow sheet :   Rationale: increase strength, improve coordination, improve balance and increase proprioception to improve the patients sensory and motor balance strategies to decrease risk of falls. With   [x] TE   [x] TA   [x] neuro   [] other: Patient Education: [x] Review HEP    [] Progressed/Changed HEP based on:   [] positioning   [] body mechanics   [] transfers   [] heat/ice application    [] other:      Other Objective/Functional Measures:   Balance:  1. Tandem 2x30\" ea 1 finger CGAx1 for safety  2.  SLS 1x30\" ea 1 finger CGAx1  3 feet together EC foam 1 finger 1x30\"  4. semitandem EO foam 1 finger 2x30\" ea    5. Feet together floor EO 1x30\" no UE    Access Code: [email or web address]   URL: [email or web address]   Date: 01/22/2020   Prepared by: Sukhi Workman Sitting Calf Stretch with Strap - 3 reps - 1 sets - 15 hold - 1x daily - 7x weekly   Seated Table Hamstring Stretch - 3 reps - 1 sets - 15 hold - 1x daily - 7x weekly   Sit to Stand with Counter Support - 10 reps - 2 sets - 1x daily - 7x weekly     Pain Level (0-10 scale) post treatment: \"better\"    ASSESSMENT/Changes in Function: Trial with 3 prong cane with education on how to use opposite leg with AD with fair tolerance and CGAx1. Pt presented with difficulty and reported not feeling steady with cane so encouraged pt to continue to use FWW; pt voiced understanding. Progressed balance exercises to use non-compliant surface with good tolerance and pt report of increased challenge. Educated pt on using \"nose over toes\" for sit to stands to help with eccentric control of descent. Used a weighted ball and NDT to help decrease use of hands and to help force control with good return demo. Due to soreness, updated HEP to include hamstring and calves stretch along with working on sit to stands with wife close by; pt voiced understanding. Written hardcopy printed for pt. Verbal cues and demonstration required to use correct body mechanics. Encouraged pt to complete HEP daily to help to continue to progress PT interventions to improve patient's ability to complete functional and community task independently. At the conclusion of the session, pt reported feeling decreased stiffness at end of session.     Patient will continue to benefit from skilled PT services to modify and progress therapeutic interventions, address functional mobility deficits, address ROM deficits, address strength deficits, analyze and address soft tissue restrictions, analyze and cue movement patterns, analyze and modify body mechanics/ergonomics, assess and modify postural abnormalities and address imbalance/dizziness to attain remaining goals.      [x]  See Plan of Care  []  See progress note/recertification  []  See Discharge Summary         Progress towards goals / Updated goals:  Short Term Goals: To be accomplished in 5 treatments:              1 patient will have established and be I with HEP to aid with self management at discharge              EVAL issued              CURRENT MET - wife reports doing them 1/6/20, 1/15/2020              2 patient will tolerate 30-45 minutes skilled PT to address generalized strengthening and balance to aid with improving functional mobility and safety with ambulation in the home and community              EVAL initiated skilled PT               CURRENT approaching - can perform 40 min without rest on 1/17/2020 ~49 min 1/22  [facility] be accomplished in 8 treatments:              1 patient will have overall reports of improvement at 50% to aid with increase tolerance to light household activities              EVAL CCO LBP and leg weakness affecting his functional mobility and tolerance to household activities              CURRENT wife and pt agree about 50% improvement 1/22              2 patient will have FOTO 54 to show significant improvement with performing his usual activities              EVAL 44, quite a bit of difficulty              CURRENT              3 patient will ambulate 570 feet with least restrictive device and no LOB for carryover to increase safety and mobility at home and in the community              EVAL 40-50 feet with RW and forward posture, SBA              CURRENT  1x 50 ft with 3 prong cane, FWW for rest of session 1/22    PLAN  [x]  Upgrade activities as tolerated     [x]  Continue plan of care  [x]  Update interventions per flow sheet       []  Discharge due to:_  []  Other:_      Wilma Martinez 1/22/2020  9:32 AM    Future Appointments   Date Time Provider Ivis Biggs   1/24/2020  9:00 AM Brie Durant, PT [facility]   1/29/2020 10:00 AM Brie Durant PT [facility]   3/20/2020  9:10 AM [facility] NURSE [facility]   4/16/2020 11:15 AM Temitope Torres MD [facility]

## 2020-01-24 ENCOUNTER — HOSPITAL ENCOUNTER (OUTPATIENT)
Dept: PHYSICAL THERAPY | Age: 85
Discharge: HOME | End: 2020-01-24
Payer: MEDICARE

## 2020-01-24 PROCEDURE — 97112 NEUROMUSCULAR REEDUCATION: CPT

## 2020-01-24 PROCEDURE — 97530 THERAPEUTIC ACTIVITIES: CPT

## 2020-01-24 PROCEDURE — 97110 THERAPEUTIC EXERCISES: CPT

## 2020-01-24 NOTE — PROGRESS NOTES
PT DAILY TREATMENT NOTE 10-18    Patient Name: Jennifer Carpenter  Date:2020  : 3/26/1927  [x]  Patient  Verified  Payor: VA MEDICARE / Plan: VA MEDICARE PART A & B / Product Type: Medicare /    In time:900  Out time:953  Total Treatment Time (min): 53  Visit #: 7 of 8    Medicare/BCBS Only   Total Timed Codes (min):  53 1:1 Treatment Time:  53       Treatment Area: Weakness [R53.1]    SUBJECTIVE  Pain Level (0-10 scale): 0  Any medication changes, allergies to medications, adverse drug reactions, diagnosis change, or new procedure performed?: [x] No    [] Yes (see summary sheet for update)  Subjective functional status/changes:   [] No changes reported  \"I was walking around my pool table for 5 minutes every day and then I got out of doing that. \"  He reports stiffness due to arthritis , no C/O pain today.      OBJECTIVE    Modality rationale:  to improve the patients ability to    Min Type Additional Details    [] Estim:  []Unatt       []IFC  []Premod                        []Other:  []w/ice   []w/heat  Position:  Location:    [] Estim: []Att    []TENS instruct  []NMES                    []Other:  []w/US   []w/ice   []w/heat  Position:  Location:    []  Traction: [] Cervical       []Lumbar                       [] Prone          []Supine                       []Intermittent   []Continuous Lbs:  [] before manual  [] after manual    []  Ultrasound: []Continuous   [] Pulsed                           []1MHz   []3MHz W/cm2:  Location:    []  Iontophoresis with dexamethasone         Location: [] Take home patch   [] In clinic    []  Ice     []  heat  []  Ice massage  []  Laser   []  Anodyne Position:  Location:    []  Laser with stim  []  Other:  Position:  Location:    []  Vasopneumatic Device Pressure:       [] lo [] med [] hi   Temperature: [] lo [] med [] hi   [] Skin assessment post-treatment:  []intact []redness- no adverse reaction    []redness  adverse reaction:      min []Eval                  []Re-Eval 15 min Therapeutic Exercise:  [x] See flow sheet :   Rationale: increase ROM, increase strength, improve coordination, improve balance and increase proprioception to improve the patients ability to tolerate ADLs and activities    25 min Therapeutic Activity:  [x]  See flow sheet :   Rationale: increase ROM, increase strength, improve coordination, improve balance and increase proprioception  to improve the patients ability to tolerate ADLS and activities     13 min Neuromuscular Re-education:  [x]  See flow sheet :   Rationale: increase ROM, increase strength, improve coordination, improve balance and increase proprioception  to improve the patients ability to tolerate ADLS and activities     min Manual Therapy:     Rationale:  to      min Gait Training:  ___ feet with ___ device on level surfaces with ___ level of assist   Rationale: With   [x] TE   [x] TA   [x] neuro   [] other: Patient Education: [x] Review HEP    [x] Progressed/Changed HEP based on:   [] positioning   [] body mechanics   [] transfers   [] heat/ice application    [] other:      Other Objective/Functional Measures: VC exercises and tech     Right SPC 75 feet CGA   No  feet CGA with improved safety and pace and symmetrical step length. Increased bike to 2.5 level    Balance:  1. Tandem 2x30\" ea 1 finger CGAx1 for safety  2. SLS 1x30\" ea 1 finger CGAx1  3 feet together EC foam 1 finger 1x30\"  4. semitandem EO foam no finger touch today and changed to 1X 60\" ea    5. Feet together floor EO 1x30\" no UE     Pain Level (0-10 scale) post treatment: 0 stiff knees    ASSESSMENT/Changes in Function: tolerated well and progressing toward established goals. Improved safety and I with no AD use with ambulation today. Appears appropriate to continue with skilled PT to maximize gains he has made.      Patient will continue to benefit from skilled PT services to modify and progress therapeutic interventions, address functional mobility deficits, address ROM deficits, address strength deficits, analyze and address soft tissue restrictions, analyze and cue movement patterns, analyze and modify body mechanics/ergonomics, assess and modify postural abnormalities, address imbalance/dizziness and instruct in home and community integration to attain remaining goals.      [x]  See Plan of Care  []  See progress note/recertification  []  See Discharge Summary         Progress towards goals / Updated goals:  Short Term Goals: To be accomplished in 5 treatments:              1 patient will have established and be I with HEP to aid with self management at discharge              EVAL issued              CURRENT MET - wife reports doing them 1/6/20, 1/15/2020  1/23/20              2 patient will tolerate 30-45 minutes skilled PT to address generalized strengthening and balance to aid with improving functional mobility and safety with ambulation in the home and community              EVAL initiated skilled PT               CURRENT 53 minutes 1/23/20  [address] be accomplished in 8 treatments:              1 patient will have overall reports of improvement at 50% to aid with increase tolerance to light household activities              EVAL CCO LBP and leg weakness affecting his functional mobility and tolerance to household activities              CURRENT wife and pt agree about 50% improvement 1/22              2 patient will have FOTO 54 to show significant improvement with performing his usual activities              EVAL 44, quite a bit of difficulty              CURRENT recheck next session 1/23/20              3 patient will ambulate 570 feet with least restrictive device and no LOB for carryover to increase safety and mobility at home and in the community              EVAL 40-50 feet with RW and forward posture, SBA              CURRENT  1x 50 ft with 3 prong cane, FWW for rest of session 1/22     75 with right SC CGA and 305 feet with no AD and CGA 1/23/20        PLAN  [x]  Upgrade activities as tolerated     [x]  Continue plan of care  []  Update interventions per flow sheet       []  Discharge due to:_  [x]  Other:_  Recheck goals and FOTO for micaela Lyon, PT 1/24/2020  9:07 AM    Future Appointments   Date Time Provider Ivis Biggs   1/29/2020 10:00 AM Janet Austin, PT [facility]   1/31/2020 10:00 AM [facility] US RM 2 [facility]   1/31/2020  1:00 PM [facility] [address] [facility] RM 1 [address]   3/20/2020  9:10 AM [facility] NURSE [facility]   4/16/2020 11:15 AM Cedrick Wadsworth MD [facility]

## 2020-01-29 ENCOUNTER — HOSPITAL ENCOUNTER (OUTPATIENT)
Dept: PHYSICAL THERAPY | Age: 85
Discharge: HOME | End: 2020-01-29
Payer: MEDICARE

## 2020-01-29 PROCEDURE — 97110 THERAPEUTIC EXERCISES: CPT

## 2020-01-29 PROCEDURE — 97530 THERAPEUTIC ACTIVITIES: CPT

## 2020-01-29 NOTE — PROGRESS NOTES
Physical Therapy Discharge Instructions    [facility] [address], [address] [place] [address], [address]  [phone] fax    Patient: Devante Mack  : 3/26/1927    Continue Home Exercise Program 3x/week, keep completing bike/walking daily    Continue with    [] Ice  as needed (20 min at a time)     [] Heat         Follow up with MD:     [x] Upon completion of therapy     [] As needed    Recommendations:     [x]   Return to activity with home program    []   Return to activity with the following modifications:       []Post Rehab Program    []Join Independent aquatic program     []Return to/join local gym      Additional Comments: Thank you for choosing us for your physical therapy services. It has been a pleasure working with you. Best wishes!     Nash Pass 2020 10:40 AM

## 2020-01-29 NOTE — PROGRESS NOTES
PT DISCHARGE DAILY NOTE AND [id number]    Patient name: Tez Mccabe Start of Care: 2019   Referral source: ARIANA Vera : 3/26/1927   Medical/Treatment Diagnosis: Weakness [R53.1] Onset Date:2019     Prior Hospitalization: see medical history Provider#: [id number]   Medications: Verified on Patient Summary List    Comorbidities: M, arthritis, Hearing impaired, Cancer prostate, stroke  Prior Level of Function: light household activities,stationery bike use,  RW for community activities, no AD in the home, community activities, I with ADLs and activiites    Visits from [place] of Care: 8    Missed Visits: 0    Reporting Period : 19 to 20    Date:2020  : 3/26/1927  [x]  Patient  Verified  Payor: VA MEDICARE / Plan: VA MEDICARE PART A & B / Product Type: Medicare /    In time:1000  Out time:1047  Total Treatment Time (min): 52  Visit #: 8 of 8    Medicare/BCBS Only   Total Timed Codes (min):  47 1:1 Treatment Time:  37       SUBJECTIVE  Pain Level (0-10 scale): 5  Any medication changes, allergies to medications, adverse drug reactions, diagnosis change, or new procedure performed?: [x] No    [] Yes (see summary sheet for update)  Subjective functional status/changes:   [] No changes reported  Pt reports pain in back and legs today. Pt presents with wife and FWW. He reports 30-40% improvement due to skilled PT intervention due to improved strength. He and wife are in agreement for discharge. OBJECTIVE    22 min Therapeutic Exercise:  [x] See flow sheet :   Rationale: increase ROM and increase strength to improve the patients ability to complete functional activities.     25 min Therapeutic Activity:  [x]  See flow sheet :   Rationale: increase ROM, increase strength, improve coordination, increase proprioception and FOTO, discharge instruction, reassessment  to improve the patients ability to complete self care with decreased assistance          With   [x] TE   [x] TA   [] neuro   [] other: Patient Education: [x] Review HEP    [] Progressed/Changed HEP based on:   [] positioning   [] body mechanics   [] transfers   [] heat/ice application    [] other:      Other Objective/Functional Measures:      Pain Level (0-10 scale) post treatment: \"better\"    Summary of Care:  Goal: patient will have established and be I with HEP to aid with self management at discharge  Status at last note/certification: most days  Status at discharge: met    Goal: patient will tolerate 30-45 minutes skilled PT to address generalized strengthening and balance to aid with improving functional mobility and safety with ambulation in the home and community  Status at last note/certification: 53  Status at discharge: met    Goal:patient will have overall reports of improvement at 50% to aid with increase tolerance to light household activities  Status at last note/certification: 00-96%  Status at discharge: not met    Goal: patient will have FOTO 54 to show significant improvement with performing his usual activities  Status at last note/certification:  40  Status at discharge: not met    Goal: patient will ambulate 570 feet with least restrictive device and no LOB for carryover to increase safety and mobility at home and in the community  Status at last note/certification: FWW in community  Status at discharge: met    ASSESSMENT/Changes in Function: Pt has completed 8 skilled PT interventions to address muscle weakness/gait abnormalities. Pt reports pain in back and legs today. Pt presents with wife and FWW. He reports 30-40% improvement due to skilled PT intervention due to improved strength. He and wife are in agreement for discharge. Pt showed improvement in strength, endurance, and improve sit to stands. Pt has met 3 goals and was progressing in other goals.  Pt will be discharged from physical therapy today with recommendations to continue completing HEP daily independently and follow up with physician after completion of PT.     Thank you for this referral!      PLAN  [x]Discontinue therapy: [x]Patient has reached or is progressing toward set goals      []Patient is non-compliant or has abdicated      []Due to lack of appreciable progress towards set goals    Nikolai Stoddard 1/29/2020  10:22 AM

## 2020-01-31 ENCOUNTER — HOSPITAL ENCOUNTER (OUTPATIENT)
Dept: NON INVASIVE DIAGNOSTICS | Age: 85
Discharge: HOME | End: 2020-01-31
Attending: PHYSICIAN ASSISTANT
Payer: MEDICARE

## 2020-01-31 ENCOUNTER — HOSPITAL ENCOUNTER (OUTPATIENT)
Dept: ULTRASOUND IMAGING | Age: 85
Discharge: HOME | End: 2020-01-31
Attending: INTERNAL MEDICINE
Payer: MEDICARE

## 2020-01-31 VITALS
HEIGHT: 70 IN | BODY MASS INDEX: 27.35 KG/M2 | DIASTOLIC BLOOD PRESSURE: 72 MMHG | WEIGHT: 191 LBS | SYSTOLIC BLOOD PRESSURE: 110 MMHG

## 2020-01-31 DIAGNOSIS — N18.30 CHRONIC KIDNEY DISEASE, STAGE 3 (MODERATE): ICD-10-CM

## 2020-01-31 DIAGNOSIS — R60.9 EDEMA, PITTING: ICD-10-CM

## 2020-01-31 LAB
ECHO AO ROOT DIAM: 3.27 CM
ECHO LA AREA 4C: 27.3 CM2
ECHO LA VOL 2C: 67.24 ML (ref 18–58)
ECHO LA VOL 4C: 90.55 ML (ref 18–58)
ECHO LA VOL BP: 86.62 ML (ref 18–58)
ECHO LA VOL/BSA BIPLANE: 42.32 ML/M2 (ref 16–28)
ECHO LA VOLUME INDEX A2C: 32.85 ML/M2 (ref 16–28)
ECHO LA VOLUME INDEX A4C: 44.24 ML/M2 (ref 16–28)
ECHO LV EDV TEICHHOLZ: 0.39 ML
ECHO LV ESV TEICHHOLZ: 0.15 ML
ECHO LV INTERNAL DIMENSION DIASTOLIC: 3.97 CM (ref 4.2–5.9)
ECHO LV INTERNAL DIMENSION SYSTOLIC: 2.65 CM
ECHO LV IVSD: 1.17 CM (ref 0.6–1)
ECHO LV MASS 2D: 180.3 G (ref 88–224)
ECHO LV MASS INDEX 2D: 88.1 G/M2 (ref 49–115)
ECHO LV POSTERIOR WALL DIASTOLIC: 1.15 CM (ref 0.6–1)
ECHO LVOT DIAM: 2.27 CM
ECHO PULMONARY ARTERY SYSTOLIC PRESSURE (PASP): 43 MMHG
ECHO RV INTERNAL DIMENSION: 3.36 CM
ECHO TV REGURGITANT MAX VELOCITY: 315.81 CM/S
ECHO TV REGURGITANT PEAK GRADIENT: 39.9 MMHG
LVFS 2D: 33.14 %
LVSV (TEICH): 20.72 ML

## 2020-01-31 PROCEDURE — 76770 US EXAM ABDO BACK WALL COMP: CPT

## 2020-01-31 PROCEDURE — 93306 TTE W/DOPPLER COMPLETE: CPT

## 2020-07-15 ENCOUNTER — HOSPITAL ENCOUNTER (OUTPATIENT)
Dept: NUCLEAR MEDICINE | Age: 85
Discharge: HOME | End: 2020-07-15
Attending: UROLOGY
Payer: MEDICARE

## 2020-07-15 ENCOUNTER — HOSPITAL ENCOUNTER (OUTPATIENT)
Dept: CT IMAGING | Age: 85
Discharge: HOME | End: 2020-07-15
Attending: UROLOGY
Payer: MEDICARE

## 2020-07-15 DIAGNOSIS — R97.21 RISING PSA FOLLOWING TREATMENT FOR MALIGNANT NEOPLASM OF PROSTATE: ICD-10-CM

## 2020-07-15 DIAGNOSIS — C61 PROSTATE CANCER (HCC): ICD-10-CM

## 2020-07-15 LAB — CREAT UR-MCNC: 1.6 MG/DL (ref 0.6–1.3)

## 2020-07-15 PROCEDURE — 82565 ASSAY OF CREATININE: CPT

## 2020-07-15 PROCEDURE — 78306 BONE IMAGING WHOLE BODY: CPT

## 2020-07-15 PROCEDURE — 74177 CT ABD & PELVIS W/CONTRAST: CPT

## 2020-07-15 PROCEDURE — 74011636320 HC RX REV CODE- 636/320: Performed by: UROLOGY

## 2020-07-15 RX ADMIN — IOPAMIDOL 70 ML: 612 INJECTION, SOLUTION INTRAVENOUS at 14:03

## 2020-12-02 ENCOUNTER — HOSPITAL ENCOUNTER (OUTPATIENT)
Dept: LAB | Age: 85
Discharge: HOME | End: 2020-12-02
Payer: MEDICARE

## 2020-12-02 ENCOUNTER — TRANSCRIBE ORDER (OUTPATIENT)
Dept: REGISTRATION | Age: 85
End: 2020-12-02

## 2020-12-02 DIAGNOSIS — D64.9 ANEMIA, UNSPECIFIED: ICD-10-CM

## 2020-12-02 DIAGNOSIS — N18.30 CHRONIC KIDNEY DISEASE, STAGE III (MODERATE) (HCC): ICD-10-CM

## 2020-12-02 DIAGNOSIS — E11.9 DIABETES MELLITUS (HCC): ICD-10-CM

## 2020-12-02 DIAGNOSIS — E78.00 HYPERCHOLESTEROLEMIA: ICD-10-CM

## 2020-12-02 DIAGNOSIS — N18.30 CHRONIC KIDNEY DISEASE, STAGE III (MODERATE) (HCC): Primary | ICD-10-CM

## 2020-12-02 DIAGNOSIS — D64.9 ANEMIA, UNSPECIFIED: Primary | ICD-10-CM

## 2020-12-02 LAB
ALBUMIN SERPL-MCNC: 3.7 G/DL (ref 3.4–5)
ALBUMIN SERPL-MCNC: 3.8 G/DL (ref 3.4–5)
ALBUMIN/GLOB SERPL: 0.9 {RATIO} (ref 0.8–1.7)
ALP SERPL-CCNC: 34 U/L (ref 45–117)
ALT SERPL-CCNC: 23 U/L (ref 16–61)
ANION GAP SERPL CALC-SCNC: 3 MMOL/L (ref 3–18)
ANION GAP SERPL CALC-SCNC: 4 MMOL/L (ref 3–18)
AST SERPL-CCNC: 23 U/L (ref 10–38)
BASOPHILS # BLD: 0 K/UL (ref 0–0.1)
BASOPHILS NFR BLD: 0 % (ref 0–2)
BILIRUB SERPL-MCNC: 0.3 MG/DL (ref 0.2–1)
BUN SERPL-MCNC: 23 MG/DL (ref 7–18)
BUN SERPL-MCNC: 23 MG/DL (ref 7–18)
BUN/CREAT SERPL: 12 (ref 12–20)
BUN/CREAT SERPL: 12 (ref 12–20)
CALCIUM SERPL-MCNC: 10.6 MG/DL (ref 8.5–10.1)
CALCIUM SERPL-MCNC: 10.9 MG/DL (ref 8.5–10.1)
CHLORIDE SERPL-SCNC: 109 MMOL/L (ref 100–111)
CHLORIDE SERPL-SCNC: 109 MMOL/L (ref 100–111)
CHOLEST SERPL-MCNC: 124 MG/DL
CO2 SERPL-SCNC: 30 MMOL/L (ref 21–32)
CO2 SERPL-SCNC: 30 MMOL/L (ref 21–32)
CREAT SERPL-MCNC: 1.87 MG/DL (ref 0.6–1.3)
CREAT SERPL-MCNC: 1.89 MG/DL (ref 0.6–1.3)
CREAT UR-MCNC: 64 MG/DL (ref 30–125)
DIFFERENTIAL METHOD BLD: ABNORMAL
EOSINOPHIL # BLD: 0.1 K/UL (ref 0–0.4)
EOSINOPHIL NFR BLD: 3 % (ref 0–5)
ERYTHROCYTE [DISTWIDTH] IN BLOOD BY AUTOMATED COUNT: 16.2 % (ref 11.6–14.5)
ERYTHROCYTE [DISTWIDTH] IN BLOOD BY AUTOMATED COUNT: 16.2 % (ref 11.6–14.5)
FERRITIN SERPL-MCNC: 200 NG/ML (ref 8–388)
GLOBULIN SER CALC-MCNC: 4 G/DL (ref 2–4)
GLUCOSE SERPL-MCNC: 93 MG/DL (ref 74–99)
GLUCOSE SERPL-MCNC: 95 MG/DL (ref 74–99)
HCT VFR BLD AUTO: 28.5 % (ref 36–48)
HCT VFR BLD AUTO: 29.4 % (ref 36–48)
HDLC SERPL-MCNC: 52 MG/DL (ref 40–60)
HDLC SERPL: 2.4 {RATIO} (ref 0–5)
HGB BLD-MCNC: 8.8 G/DL (ref 13–16)
HGB BLD-MCNC: 8.8 G/DL (ref 13–16)
IRON SATN MFR SERPL: 22 % (ref 20–50)
IRON SERPL-MCNC: 58 UG/DL (ref 50–175)
LDLC SERPL CALC-MCNC: 60.4 MG/DL (ref 0–100)
LIPID PROFILE,FLP: NORMAL
LYMPHOCYTES # BLD: 2 K/UL (ref 0.9–3.6)
LYMPHOCYTES NFR BLD: 59 % (ref 21–52)
MCH RBC QN AUTO: 25.8 PG (ref 24–34)
MCH RBC QN AUTO: 26.7 PG (ref 24–34)
MCHC RBC AUTO-ENTMCNC: 29.9 G/DL (ref 31–37)
MCHC RBC AUTO-ENTMCNC: 30.9 G/DL (ref 31–37)
MCV RBC AUTO: 86.2 FL (ref 74–97)
MCV RBC AUTO: 86.4 FL (ref 74–97)
MICROALBUMIN UR-MCNC: 1.61 MG/DL (ref 0–3)
MICROALBUMIN/CREAT UR-RTO: 25 MG/G (ref 0–30)
MONOCYTES # BLD: 0.2 K/UL (ref 0.05–1.2)
MONOCYTES NFR BLD: 5 % (ref 3–10)
NEUTS SEG # BLD: 1.1 K/UL (ref 1.8–8)
NEUTS SEG NFR BLD: 33 % (ref 40–73)
PHOSPHATE SERPL-MCNC: 3.8 MG/DL (ref 2.5–4.9)
PLATELET # BLD AUTO: 192 K/UL (ref 135–420)
PLATELET # BLD AUTO: 217 K/UL (ref 135–420)
PMV BLD AUTO: 10.7 FL (ref 9.2–11.8)
PMV BLD AUTO: 11.8 FL (ref 9.2–11.8)
POTASSIUM SERPL-SCNC: 4.4 MMOL/L (ref 3.5–5.5)
POTASSIUM SERPL-SCNC: 4.6 MMOL/L (ref 3.5–5.5)
PROT SERPL-MCNC: 7.7 G/DL (ref 6.4–8.2)
RBC # BLD AUTO: 3.3 M/UL (ref 4.7–5.5)
RBC # BLD AUTO: 3.41 M/UL (ref 4.7–5.5)
SODIUM SERPL-SCNC: 142 MMOL/L (ref 136–145)
SODIUM SERPL-SCNC: 143 MMOL/L (ref 136–145)
TIBC SERPL-MCNC: 267 UG/DL (ref 250–450)
TRIGL SERPL-MCNC: 58 MG/DL (ref ?–150)
VLDLC SERPL CALC-MCNC: 11.6 MG/DL
WBC # BLD AUTO: 3.5 K/UL (ref 4.6–13.2)
WBC # BLD AUTO: 3.6 K/UL (ref 4.6–13.2)

## 2020-12-02 PROCEDURE — 80069 RENAL FUNCTION PANEL: CPT

## 2020-12-02 PROCEDURE — 83540 ASSAY OF IRON: CPT

## 2020-12-02 PROCEDURE — 80053 COMPREHEN METABOLIC PANEL: CPT

## 2020-12-02 PROCEDURE — 82043 UR ALBUMIN QUANTITATIVE: CPT

## 2020-12-02 PROCEDURE — 36415 COLL VENOUS BLD VENIPUNCTURE: CPT

## 2020-12-02 PROCEDURE — 85027 COMPLETE CBC AUTOMATED: CPT

## 2020-12-02 PROCEDURE — 82728 ASSAY OF FERRITIN: CPT

## 2020-12-02 PROCEDURE — 85025 COMPLETE CBC W/AUTO DIFF WBC: CPT

## 2020-12-02 PROCEDURE — 80061 LIPID PANEL: CPT

## 2021-03-15 PROBLEM — N18.30 STAGE 3 CHRONIC KIDNEY DISEASE (HCC): Status: ACTIVE | Noted: 2020-01-21

## 2021-03-15 PROBLEM — I12.9 CHRONIC KIDNEY DISEASE DUE TO BENIGN HYPERTENSION: Status: ACTIVE | Noted: 2020-01-21

## 2021-03-26 ENCOUNTER — HOSPITAL ENCOUNTER (OUTPATIENT)
Dept: NUCLEAR MEDICINE | Age: 86
Discharge: HOME | End: 2021-03-26
Attending: UROLOGY
Payer: MEDICARE

## 2021-03-26 ENCOUNTER — HOSPITAL ENCOUNTER (OUTPATIENT)
Dept: CT IMAGING | Age: 86
Discharge: HOME | End: 2021-03-26
Attending: UROLOGY
Payer: MEDICARE

## 2021-03-26 DIAGNOSIS — C61 PROSTATE CANCER (HCC): ICD-10-CM

## 2021-03-26 LAB — CREAT UR-MCNC: 2 MG/DL (ref 0.6–1.3)

## 2021-03-26 PROCEDURE — A9503 TC99M MEDRONATE: HCPCS

## 2021-03-26 PROCEDURE — 82565 ASSAY OF CREATININE: CPT

## 2021-03-26 PROCEDURE — 74176 CT ABD & PELVIS W/O CONTRAST: CPT

## 2021-08-26 ENCOUNTER — HOSPITAL ENCOUNTER (OUTPATIENT)
Dept: LAB | Age: 86
Discharge: HOME | End: 2021-08-26
Payer: MEDICARE

## 2021-08-26 ENCOUNTER — TRANSCRIBE ORDER (OUTPATIENT)
Dept: REGISTRATION | Age: 86
End: 2021-08-26

## 2021-08-26 DIAGNOSIS — I10 ESSENTIAL HYPERTENSION, MALIGNANT: Primary | ICD-10-CM

## 2021-08-26 DIAGNOSIS — E78.00 PURE HYPERCHOLESTEROLEMIA: ICD-10-CM

## 2021-08-26 DIAGNOSIS — I10 ESSENTIAL HYPERTENSION, MALIGNANT: ICD-10-CM

## 2021-08-26 LAB
ALBUMIN SERPL-MCNC: 3.5 G/DL (ref 3.4–5)
ALBUMIN/GLOB SERPL: 0.9 {RATIO} (ref 0.8–1.7)
ALP SERPL-CCNC: 29 U/L (ref 45–117)
ALT SERPL-CCNC: 32 U/L (ref 16–61)
ANION GAP SERPL CALC-SCNC: 5 MMOL/L (ref 3–18)
AST SERPL-CCNC: 32 U/L (ref 10–38)
BASOPHILS # BLD: 0 K/UL (ref 0–0.1)
BASOPHILS NFR BLD: 0 % (ref 0–2)
BILIRUB SERPL-MCNC: 0.8 MG/DL (ref 0.2–1)
BUN SERPL-MCNC: 13 MG/DL (ref 7–18)
BUN/CREAT SERPL: 9 (ref 12–20)
CALCIUM SERPL-MCNC: 10.2 MG/DL (ref 8.5–10.1)
CHLORIDE SERPL-SCNC: 108 MMOL/L (ref 100–111)
CHOLEST SERPL-MCNC: 112 MG/DL
CO2 SERPL-SCNC: 31 MMOL/L (ref 21–32)
CREAT SERPL-MCNC: 1.45 MG/DL (ref 0.6–1.3)
DIFFERENTIAL METHOD BLD: ABNORMAL
EOSINOPHIL # BLD: 0.1 K/UL (ref 0–0.4)
EOSINOPHIL NFR BLD: 3 % (ref 0–5)
ERYTHROCYTE [DISTWIDTH] IN BLOOD BY AUTOMATED COUNT: 18.2 % (ref 11.6–14.5)
GLOBULIN SER CALC-MCNC: 3.9 G/DL (ref 2–4)
GLUCOSE SERPL-MCNC: 88 MG/DL (ref 74–99)
HCT VFR BLD AUTO: 30.5 % (ref 36–48)
HDLC SERPL-MCNC: 51 MG/DL (ref 40–60)
HDLC SERPL: 2.2 {RATIO} (ref 0–5)
HGB BLD-MCNC: 9.2 G/DL (ref 13–16)
LDLC SERPL CALC-MCNC: 55.8 MG/DL (ref 0–100)
LIPID PROFILE,FLP: NORMAL
LYMPHOCYTES # BLD: 2 K/UL (ref 0.9–3.6)
LYMPHOCYTES NFR BLD: 70 % (ref 21–52)
MCH RBC QN AUTO: 26.1 PG (ref 24–34)
MCHC RBC AUTO-ENTMCNC: 30.2 G/DL (ref 31–37)
MCV RBC AUTO: 86.6 FL (ref 78–100)
MONOCYTES # BLD: 0.1 K/UL (ref 0.05–1.2)
MONOCYTES NFR BLD: 4 % (ref 3–10)
NEUTS SEG # BLD: 0.7 K/UL (ref 1.8–8)
NEUTS SEG NFR BLD: 23 % (ref 40–73)
PLATELET # BLD AUTO: 217 K/UL (ref 135–420)
PLATELET COMMENTS,PCOM: ABNORMAL
PMV BLD AUTO: 11.5 FL (ref 9.2–11.8)
POTASSIUM SERPL-SCNC: 4.9 MMOL/L (ref 3.5–5.5)
PROT SERPL-MCNC: 7.4 G/DL (ref 6.4–8.2)
RBC # BLD AUTO: 3.52 M/UL (ref 4.35–5.65)
RBC MORPH BLD: ABNORMAL
SODIUM SERPL-SCNC: 144 MMOL/L (ref 136–145)
TRIGL SERPL-MCNC: 26 MG/DL (ref ?–150)
VLDLC SERPL CALC-MCNC: 5.2 MG/DL
WBC # BLD AUTO: 2.9 K/UL (ref 4.6–13.2)

## 2021-08-26 PROCEDURE — 80061 LIPID PANEL: CPT

## 2021-08-26 PROCEDURE — 85025 COMPLETE CBC W/AUTO DIFF WBC: CPT

## 2021-08-26 PROCEDURE — 80053 COMPREHEN METABOLIC PANEL: CPT

## 2021-08-26 PROCEDURE — 36415 COLL VENOUS BLD VENIPUNCTURE: CPT

## 2021-09-09 ENCOUNTER — HOSPITAL ENCOUNTER (OUTPATIENT)
Dept: LAB | Age: 86
Discharge: HOME | End: 2021-09-09
Payer: MEDICARE

## 2021-09-09 ENCOUNTER — TRANSCRIBE ORDER (OUTPATIENT)
Dept: REGISTRATION | Age: 86
End: 2021-09-09

## 2021-09-09 DIAGNOSIS — N18.31 CHRONIC KIDNEY DISEASE (CKD) STAGE G3A/A1, MODERATELY DECREASED GLOMERULAR FILTRATION RATE (GFR) BETWEEN 45-59 ML/MIN/1.73 SQUARE METER AND ALBUMINURIA CREATININE RATIO LESS THAN 30 MG/G (HCC): Primary | ICD-10-CM

## 2021-09-09 DIAGNOSIS — N18.31 CHRONIC KIDNEY DISEASE (CKD) STAGE G3A/A1, MODERATELY DECREASED GLOMERULAR FILTRATION RATE (GFR) BETWEEN 45-59 ML/MIN/1.73 SQUARE METER AND ALBUMINURIA CREATININE RATIO LESS THAN 30 MG/G (HCC): ICD-10-CM

## 2021-09-09 LAB
25(OH)D3 SERPL-MCNC: 55.3 NG/ML (ref 30–100)
ALBUMIN SERPL-MCNC: 3.8 G/DL (ref 3.4–5)
ANION GAP SERPL CALC-SCNC: 8 MMOL/L (ref 3–18)
BUN SERPL-MCNC: 17 MG/DL (ref 7–18)
BUN/CREAT SERPL: 11 (ref 12–20)
CALCIUM SERPL-MCNC: 9.6 MG/DL (ref 8.5–10.1)
CALCIUM SERPL-MCNC: 9.8 MG/DL (ref 8.5–10.1)
CHLORIDE SERPL-SCNC: 108 MMOL/L (ref 100–111)
CO2 SERPL-SCNC: 29 MMOL/L (ref 21–32)
CREAT SERPL-MCNC: 1.55 MG/DL (ref 0.6–1.3)
CREAT UR-MCNC: 143 MG/DL (ref 30–125)
GLUCOSE SERPL-MCNC: 94 MG/DL (ref 74–99)
HCT VFR BLD AUTO: 30.9 % (ref 36–48)
HGB BLD-MCNC: 9.4 G/DL (ref 13–16)
IRON SATN MFR SERPL: 30 % (ref 20–50)
IRON SERPL-MCNC: 80 UG/DL (ref 50–175)
MICROALBUMIN UR-MCNC: 4.55 MG/DL (ref 0–3)
MICROALBUMIN/CREAT UR-RTO: 32 MG/G (ref 0–30)
PHOSPHATE SERPL-MCNC: 3.4 MG/DL (ref 2.5–4.9)
POTASSIUM SERPL-SCNC: 4.3 MMOL/L (ref 3.5–5.5)
PTH-INTACT SERPL-MCNC: 23 PG/ML (ref 18.4–88)
SODIUM SERPL-SCNC: 145 MMOL/L (ref 136–145)
TIBC SERPL-MCNC: 263 UG/DL (ref 250–450)

## 2021-09-09 PROCEDURE — 83970 ASSAY OF PARATHORMONE: CPT

## 2021-09-09 PROCEDURE — 82306 VITAMIN D 25 HYDROXY: CPT

## 2021-09-09 PROCEDURE — 36415 COLL VENOUS BLD VENIPUNCTURE: CPT

## 2021-09-09 PROCEDURE — 80069 RENAL FUNCTION PANEL: CPT

## 2021-09-09 PROCEDURE — 82043 UR ALBUMIN QUANTITATIVE: CPT

## 2021-09-09 PROCEDURE — 85018 HEMOGLOBIN: CPT

## 2021-09-09 PROCEDURE — 83540 ASSAY OF IRON: CPT

## 2021-11-19 PROBLEM — H16.223 KERATOCONJUNCTIVITIS SICCA, NOT SPECIFIED AS SJOGREN'S, BILATERAL: Status: ACTIVE | Noted: 2021-11-19

## 2021-11-19 PROBLEM — I51.7 LEFT VENTRICULAR HYPERTROPHY: Status: ACTIVE | Noted: 2021-11-19

## 2021-11-19 PROBLEM — I07.1 TRICUSPID REGURGITATION: Status: ACTIVE | Noted: 2021-11-19

## 2021-11-19 PROBLEM — H40.1132 PRIMARY OPEN-ANGLE GLAUCOMA, BILATERAL, MODERATE STAGE: Status: ACTIVE | Noted: 2021-11-19

## 2021-11-19 PROBLEM — R53.1 WEAKNESS: Status: ACTIVE | Noted: 2021-11-19

## 2021-11-19 PROBLEM — I51.89 DIASTOLIC DYSFUNCTION: Status: ACTIVE | Noted: 2021-11-19

## 2022-01-24 ENCOUNTER — HOSPITAL ENCOUNTER (OUTPATIENT)
Dept: NUCLEAR MEDICINE | Age: 87
Discharge: HOME | End: 2022-01-24
Attending: PHYSICIAN ASSISTANT
Payer: MEDICARE

## 2022-01-24 ENCOUNTER — HOSPITAL ENCOUNTER (OUTPATIENT)
Dept: CT IMAGING | Age: 87
Discharge: HOME | End: 2022-01-24
Attending: PHYSICIAN ASSISTANT
Payer: MEDICARE

## 2022-01-24 DIAGNOSIS — C61 PROSTATE CANCER (HCC): ICD-10-CM

## 2022-01-24 DIAGNOSIS — R97.21 RISING PSA FOLLOWING TREATMENT FOR MALIGNANT NEOPLASM OF PROSTATE: ICD-10-CM

## 2022-01-24 PROCEDURE — 78306 BONE IMAGING WHOLE BODY: CPT

## 2022-01-24 PROCEDURE — 74176 CT ABD & PELVIS W/O CONTRAST: CPT

## 2022-04-30 ENCOUNTER — HOSPITAL ENCOUNTER (INPATIENT)
Age: 87
LOS: 2 days | Discharge: NURSING, REHAB OR LONG-TERM CARE | DRG: 378 | End: 2022-05-03
Attending: EMERGENCY MEDICINE | Admitting: STUDENT IN AN ORGANIZED HEALTH CARE EDUCATION/TRAINING PROGRAM
Payer: MEDICARE

## 2022-04-30 DIAGNOSIS — Z71.89 GOALS OF CARE, COUNSELING/DISCUSSION: ICD-10-CM

## 2022-04-30 DIAGNOSIS — K62.5 RECTAL BLEEDING: Primary | ICD-10-CM

## 2022-04-30 DIAGNOSIS — F03.90 DEMENTIA WITHOUT BEHAVIORAL DISTURBANCE, UNSPECIFIED DEMENTIA TYPE: ICD-10-CM

## 2022-04-30 DIAGNOSIS — K92.2 GASTROINTESTINAL HEMORRHAGE, UNSPECIFIED GASTROINTESTINAL HEMORRHAGE TYPE: ICD-10-CM

## 2022-04-30 DIAGNOSIS — R53.81 DEBILITY: ICD-10-CM

## 2022-04-30 PROCEDURE — 85025 COMPLETE CBC W/AUTO DIFF WBC: CPT

## 2022-04-30 PROCEDURE — 93005 ELECTROCARDIOGRAM TRACING: CPT

## 2022-04-30 PROCEDURE — 86900 BLOOD TYPING SEROLOGIC ABO: CPT

## 2022-04-30 PROCEDURE — 86920 COMPATIBILITY TEST SPIN: CPT

## 2022-04-30 PROCEDURE — 83735 ASSAY OF MAGNESIUM: CPT

## 2022-04-30 PROCEDURE — 99285 EMERGENCY DEPT VISIT HI MDM: CPT

## 2022-04-30 PROCEDURE — 80053 COMPREHEN METABOLIC PANEL: CPT

## 2022-05-01 ENCOUNTER — APPOINTMENT (OUTPATIENT)
Dept: CT IMAGING | Age: 87
DRG: 378 | End: 2022-05-01
Attending: EMERGENCY MEDICINE
Payer: MEDICARE

## 2022-05-01 PROBLEM — K92.2 GI BLEED: Status: ACTIVE | Noted: 2022-05-01

## 2022-05-01 LAB
ALBUMIN SERPL-MCNC: 2.5 G/DL (ref 3.4–5)
ALBUMIN SERPL-MCNC: 2.6 G/DL (ref 3.4–5)
ALBUMIN/GLOB SERPL: 0.5 {RATIO} (ref 0.8–1.7)
ALBUMIN/GLOB SERPL: 0.5 {RATIO} (ref 0.8–1.7)
ALP SERPL-CCNC: 55 U/L (ref 45–117)
ALP SERPL-CCNC: 64 U/L (ref 45–117)
ALT SERPL-CCNC: 19 U/L (ref 16–61)
ALT SERPL-CCNC: 20 U/L (ref 16–61)
ANION GAP SERPL CALC-SCNC: 1 MMOL/L (ref 3–18)
ANION GAP SERPL CALC-SCNC: 4 MMOL/L (ref 3–18)
APPEARANCE UR: CLEAR
APTT PPP: 29.8 SEC (ref 23–36.4)
AST SERPL-CCNC: 20 U/L (ref 10–38)
AST SERPL-CCNC: 23 U/L (ref 10–38)
ATRIAL RATE: 94 BPM
BACTERIA URNS QL MICRO: ABNORMAL /HPF
BASOPHILS # BLD: 0 K/UL (ref 0–0.1)
BASOPHILS NFR BLD: 0 % (ref 0–2)
BILIRUB SERPL-MCNC: 0.5 MG/DL (ref 0.2–1)
BILIRUB SERPL-MCNC: 0.9 MG/DL (ref 0.2–1)
BILIRUB UR QL: NEGATIVE
BUN SERPL-MCNC: 18 MG/DL (ref 7–18)
BUN SERPL-MCNC: 18 MG/DL (ref 7–18)
BUN/CREAT SERPL: 13 (ref 12–20)
BUN/CREAT SERPL: 13 (ref 12–20)
CALCIUM SERPL-MCNC: 9.7 MG/DL (ref 8.5–10.1)
CALCIUM SERPL-MCNC: 9.8 MG/DL (ref 8.5–10.1)
CALCULATED R AXIS, ECG10: 33 DEGREES
CALCULATED T AXIS, ECG11: 139 DEGREES
CHLORIDE SERPL-SCNC: 106 MMOL/L (ref 100–111)
CHLORIDE SERPL-SCNC: 109 MMOL/L (ref 100–111)
CO2 SERPL-SCNC: 30 MMOL/L (ref 21–32)
CO2 SERPL-SCNC: 31 MMOL/L (ref 21–32)
COLOR UR: YELLOW
CREAT SERPL-MCNC: 1.34 MG/DL (ref 0.6–1.3)
CREAT SERPL-MCNC: 1.4 MG/DL (ref 0.6–1.3)
DIAGNOSIS, 93000: NORMAL
DIFFERENTIAL METHOD BLD: ABNORMAL
EOSINOPHIL # BLD: 0.1 K/UL (ref 0–0.4)
EOSINOPHIL NFR BLD: 2 % (ref 0–5)
EPITH CASTS URNS QL MICRO: NEGATIVE /LPF (ref 0–5)
ERYTHROCYTE [DISTWIDTH] IN BLOOD BY AUTOMATED COUNT: 18.2 % (ref 11.6–14.5)
ERYTHROCYTE [DISTWIDTH] IN BLOOD BY AUTOMATED COUNT: 18.6 % (ref 11.6–14.5)
GLOBULIN SER CALC-MCNC: 4.6 G/DL (ref 2–4)
GLOBULIN SER CALC-MCNC: 5.4 G/DL (ref 2–4)
GLUCOSE BLD STRIP.AUTO-MCNC: 114 MG/DL (ref 70–110)
GLUCOSE BLD STRIP.AUTO-MCNC: 122 MG/DL (ref 70–110)
GLUCOSE BLD STRIP.AUTO-MCNC: 144 MG/DL (ref 70–110)
GLUCOSE BLD STRIP.AUTO-MCNC: 149 MG/DL (ref 70–110)
GLUCOSE SERPL-MCNC: 119 MG/DL (ref 74–99)
GLUCOSE SERPL-MCNC: 127 MG/DL (ref 74–99)
GLUCOSE UR STRIP.AUTO-MCNC: NEGATIVE MG/DL
HCT VFR BLD AUTO: 25.2 % (ref 36–48)
HCT VFR BLD AUTO: 25.4 % (ref 36–48)
HCT VFR BLD AUTO: 25.6 % (ref 36–48)
HCT VFR BLD AUTO: 27 % (ref 36–48)
HEMOCCULT STL QL: POSITIVE
HGB BLD-MCNC: 7.7 G/DL (ref 13–16)
HGB BLD-MCNC: 7.8 G/DL (ref 13–16)
HGB BLD-MCNC: 7.8 G/DL (ref 13–16)
HGB BLD-MCNC: 8.2 G/DL (ref 13–16)
HGB UR QL STRIP: NEGATIVE
IMM GRANULOCYTES # BLD AUTO: 0 K/UL (ref 0–0.04)
IMM GRANULOCYTES NFR BLD AUTO: 1 % (ref 0–0.5)
INR PPP: 1.2 (ref 0.8–1.2)
KETONES UR QL STRIP.AUTO: NEGATIVE MG/DL
LACTATE SERPL-SCNC: 0.8 MMOL/L (ref 0.4–2)
LEUKOCYTE ESTERASE UR QL STRIP.AUTO: ABNORMAL
LYMPHOCYTES # BLD: 2.4 K/UL (ref 0.9–3.6)
LYMPHOCYTES NFR BLD: 51 % (ref 21–52)
MAGNESIUM SERPL-MCNC: 2.1 MG/DL (ref 1.6–2.6)
MAGNESIUM SERPL-MCNC: 2.2 MG/DL (ref 1.6–2.6)
MCH RBC QN AUTO: 26.7 PG (ref 24–34)
MCH RBC QN AUTO: 27.3 PG (ref 24–34)
MCHC RBC AUTO-ENTMCNC: 30.1 G/DL (ref 31–37)
MCHC RBC AUTO-ENTMCNC: 30.7 G/DL (ref 31–37)
MCV RBC AUTO: 88.8 FL (ref 78–100)
MCV RBC AUTO: 88.9 FL (ref 78–100)
MONOCYTES # BLD: 0.3 K/UL (ref 0.05–1.2)
MONOCYTES NFR BLD: 6 % (ref 3–10)
NEUTS SEG # BLD: 2 K/UL (ref 1.8–8)
NEUTS SEG NFR BLD: 41 % (ref 40–73)
NITRITE UR QL STRIP.AUTO: POSITIVE
NRBC # BLD: 0 K/UL (ref 0–0.01)
NRBC # BLD: 0 K/UL (ref 0–0.01)
NRBC BLD-RTO: 0 PER 100 WBC
NRBC BLD-RTO: 0 PER 100 WBC
PH UR STRIP: 6 [PH] (ref 5–8)
PLATELET # BLD AUTO: 202 K/UL (ref 135–420)
PLATELET # BLD AUTO: 265 K/UL (ref 135–420)
PMV BLD AUTO: 11 FL (ref 9.2–11.8)
PMV BLD AUTO: 13.2 FL (ref 9.2–11.8)
POTASSIUM SERPL-SCNC: 4.4 MMOL/L (ref 3.5–5.5)
POTASSIUM SERPL-SCNC: 5.2 MMOL/L (ref 3.5–5.5)
PROT SERPL-MCNC: 7.1 G/DL (ref 6.4–8.2)
PROT SERPL-MCNC: 8 G/DL (ref 6.4–8.2)
PROT UR STRIP-MCNC: NEGATIVE MG/DL
PROTHROMBIN TIME: 15.2 SEC (ref 11.5–15.2)
Q-T INTERVAL, ECG07: 372 MS
QRS DURATION, ECG06: 124 MS
QTC CALCULATION (BEZET), ECG08: 465 MS
RBC # BLD AUTO: 2.86 M/UL (ref 4.35–5.65)
RBC # BLD AUTO: 2.88 M/UL (ref 4.35–5.65)
RBC #/AREA URNS HPF: NEGATIVE /HPF (ref 0–5)
SODIUM SERPL-SCNC: 140 MMOL/L (ref 136–145)
SODIUM SERPL-SCNC: 141 MMOL/L (ref 136–145)
SP GR UR REFRACTOMETRY: >1.03 (ref 1–1.03)
UROBILINOGEN UR QL STRIP.AUTO: 1 EU/DL (ref 0.2–1)
VENTRICULAR RATE, ECG03: 94 BPM
WBC # BLD AUTO: 3.7 K/UL (ref 4.6–13.2)
WBC # BLD AUTO: 4.8 K/UL (ref 4.6–13.2)
WBC URNS QL MICRO: ABNORMAL /HPF (ref 0–4)

## 2022-05-01 PROCEDURE — 85027 COMPLETE CBC AUTOMATED: CPT

## 2022-05-01 PROCEDURE — 74011250637 HC RX REV CODE- 250/637: Performed by: HOSPITALIST

## 2022-05-01 PROCEDURE — C9113 INJ PANTOPRAZOLE SODIUM, VIA: HCPCS | Performed by: EMERGENCY MEDICINE

## 2022-05-01 PROCEDURE — 99221 1ST HOSP IP/OBS SF/LOW 40: CPT | Performed by: STUDENT IN AN ORGANIZED HEALTH CARE EDUCATION/TRAINING PROGRAM

## 2022-05-01 PROCEDURE — 74011250636 HC RX REV CODE- 250/636: Performed by: HOSPITALIST

## 2022-05-01 PROCEDURE — 82270 OCCULT BLOOD FECES: CPT

## 2022-05-01 PROCEDURE — 36415 COLL VENOUS BLD VENIPUNCTURE: CPT

## 2022-05-01 PROCEDURE — 77030018842 HC SOL IRR SOD CL 9% BAXT -A

## 2022-05-01 PROCEDURE — 83605 ASSAY OF LACTIC ACID: CPT

## 2022-05-01 PROCEDURE — 74011000250 HC RX REV CODE- 250: Performed by: STUDENT IN AN ORGANIZED HEALTH CARE EDUCATION/TRAINING PROGRAM

## 2022-05-01 PROCEDURE — 2709999900 HC NON-CHARGEABLE SUPPLY

## 2022-05-01 PROCEDURE — 85018 HEMOGLOBIN: CPT

## 2022-05-01 PROCEDURE — C9113 INJ PANTOPRAZOLE SODIUM, VIA: HCPCS | Performed by: HOSPITALIST

## 2022-05-01 PROCEDURE — 96374 THER/PROPH/DIAG INJ IV PUSH: CPT

## 2022-05-01 PROCEDURE — 92610 EVALUATE SWALLOWING FUNCTION: CPT

## 2022-05-01 PROCEDURE — 74011000250 HC RX REV CODE- 250: Performed by: EMERGENCY MEDICINE

## 2022-05-01 PROCEDURE — P9016 RBC LEUKOCYTES REDUCED: HCPCS

## 2022-05-01 PROCEDURE — 82962 GLUCOSE BLOOD TEST: CPT

## 2022-05-01 PROCEDURE — 74174 CTA ABD&PLVS W/CONTRAST: CPT

## 2022-05-01 PROCEDURE — 85730 THROMBOPLASTIN TIME PARTIAL: CPT

## 2022-05-01 PROCEDURE — 36430 TRANSFUSION BLD/BLD COMPNT: CPT

## 2022-05-01 PROCEDURE — 85610 PROTHROMBIN TIME: CPT

## 2022-05-01 PROCEDURE — 80053 COMPREHEN METABOLIC PANEL: CPT

## 2022-05-01 PROCEDURE — 74011250636 HC RX REV CODE- 250/636: Performed by: EMERGENCY MEDICINE

## 2022-05-01 PROCEDURE — 83735 ASSAY OF MAGNESIUM: CPT

## 2022-05-01 PROCEDURE — 65270000046 HC RM TELEMETRY

## 2022-05-01 PROCEDURE — 74011000250 HC RX REV CODE- 250: Performed by: HOSPITALIST

## 2022-05-01 PROCEDURE — 81001 URINALYSIS AUTO W/SCOPE: CPT

## 2022-05-01 PROCEDURE — 74011000636 HC RX REV CODE- 636: Performed by: EMERGENCY MEDICINE

## 2022-05-01 RX ORDER — INSULIN LISPRO 100 [IU]/ML
INJECTION, SOLUTION INTRAVENOUS; SUBCUTANEOUS EVERY 6 HOURS
Status: DISCONTINUED | OUTPATIENT
Start: 2022-05-01 | End: 2022-05-03 | Stop reason: HOSPADM

## 2022-05-01 RX ORDER — SODIUM CHLORIDE 0.9 % (FLUSH) 0.9 %
5-40 SYRINGE (ML) INJECTION AS NEEDED
Status: DISCONTINUED | OUTPATIENT
Start: 2022-05-01 | End: 2022-05-03 | Stop reason: HOSPADM

## 2022-05-01 RX ORDER — SODIUM CHLORIDE 0.9 % (FLUSH) 0.9 %
5-40 SYRINGE (ML) INJECTION EVERY 8 HOURS
Status: DISCONTINUED | OUTPATIENT
Start: 2022-05-01 | End: 2022-05-03 | Stop reason: HOSPADM

## 2022-05-01 RX ORDER — ONDANSETRON 4 MG/1
4 TABLET, ORALLY DISINTEGRATING ORAL
Status: DISCONTINUED | OUTPATIENT
Start: 2022-05-01 | End: 2022-05-03 | Stop reason: HOSPADM

## 2022-05-01 RX ORDER — DEXTROSE MONOHYDRATE 100 MG/ML
0-250 INJECTION, SOLUTION INTRAVENOUS AS NEEDED
Status: DISCONTINUED | OUTPATIENT
Start: 2022-05-01 | End: 2022-05-03 | Stop reason: HOSPADM

## 2022-05-01 RX ORDER — SODIUM CHLORIDE 9 MG/ML
250 INJECTION, SOLUTION INTRAVENOUS AS NEEDED
Status: DISCONTINUED | OUTPATIENT
Start: 2022-05-01 | End: 2022-05-01

## 2022-05-01 RX ORDER — ACETAMINOPHEN 650 MG/1
650 SUPPOSITORY RECTAL
Status: DISCONTINUED | OUTPATIENT
Start: 2022-05-01 | End: 2022-05-03 | Stop reason: HOSPADM

## 2022-05-01 RX ORDER — POLYETHYLENE GLYCOL 3350 17 G/17G
17 POWDER, FOR SOLUTION ORAL DAILY PRN
Status: DISCONTINUED | OUTPATIENT
Start: 2022-05-01 | End: 2022-05-03 | Stop reason: HOSPADM

## 2022-05-01 RX ORDER — LATANOPROST 50 UG/ML
1 SOLUTION/ DROPS OPHTHALMIC DAILY
Status: DISCONTINUED | OUTPATIENT
Start: 2022-05-01 | End: 2022-05-03 | Stop reason: HOSPADM

## 2022-05-01 RX ORDER — PANTOPRAZOLE SODIUM 40 MG/10ML
80 INJECTION, POWDER, LYOPHILIZED, FOR SOLUTION INTRAVENOUS
Status: DISCONTINUED | OUTPATIENT
Start: 2022-05-01 | End: 2022-05-01 | Stop reason: CLARIF

## 2022-05-01 RX ORDER — PANTOPRAZOLE SODIUM 40 MG/10ML
40 INJECTION, POWDER, LYOPHILIZED, FOR SOLUTION INTRAVENOUS EVERY 12 HOURS
Status: DISCONTINUED | OUTPATIENT
Start: 2022-05-01 | End: 2022-05-02

## 2022-05-01 RX ORDER — ATORVASTATIN CALCIUM 40 MG/1
40 TABLET, FILM COATED ORAL DAILY
Status: DISCONTINUED | OUTPATIENT
Start: 2022-05-01 | End: 2022-05-03 | Stop reason: HOSPADM

## 2022-05-01 RX ORDER — ACETAMINOPHEN 325 MG/1
650 TABLET ORAL
Status: DISCONTINUED | OUTPATIENT
Start: 2022-05-01 | End: 2022-05-03 | Stop reason: HOSPADM

## 2022-05-01 RX ORDER — MAGNESIUM SULFATE 100 %
16 CRYSTALS MISCELLANEOUS AS NEEDED
Status: DISCONTINUED | OUTPATIENT
Start: 2022-05-01 | End: 2022-05-03 | Stop reason: HOSPADM

## 2022-05-01 RX ORDER — ONDANSETRON 2 MG/ML
4 INJECTION INTRAMUSCULAR; INTRAVENOUS
Status: DISCONTINUED | OUTPATIENT
Start: 2022-05-01 | End: 2022-05-03 | Stop reason: HOSPADM

## 2022-05-01 RX ORDER — CYCLOSPORINE 0.5 MG/ML
1 EMULSION OPHTHALMIC 2 TIMES DAILY
Status: DISCONTINUED | OUTPATIENT
Start: 2022-05-01 | End: 2022-05-03 | Stop reason: HOSPADM

## 2022-05-01 RX ORDER — SODIUM CHLORIDE 9 MG/ML
50 INJECTION, SOLUTION INTRAVENOUS CONTINUOUS
Status: DISCONTINUED | OUTPATIENT
Start: 2022-05-01 | End: 2022-05-02

## 2022-05-01 RX ADMIN — SODIUM CHLORIDE, PRESERVATIVE FREE 10 ML: 5 INJECTION INTRAVENOUS at 18:02

## 2022-05-01 RX ADMIN — SODIUM CHLORIDE 50 ML/HR: 9 INJECTION, SOLUTION INTRAVENOUS at 13:27

## 2022-05-01 RX ADMIN — SODIUM CHLORIDE 80 MG: 9 INJECTION, SOLUTION INTRAMUSCULAR; INTRAVENOUS; SUBCUTANEOUS at 01:02

## 2022-05-01 RX ADMIN — IOPAMIDOL 100 ML: 755 INJECTION, SOLUTION INTRAVENOUS at 00:28

## 2022-05-01 RX ADMIN — CYCLOSPORINE 1 DROP: 0.5 EMULSION OPHTHALMIC at 18:02

## 2022-05-01 RX ADMIN — SODIUM CHLORIDE, PRESERVATIVE FREE 10 ML: 5 INJECTION INTRAVENOUS at 06:39

## 2022-05-01 RX ADMIN — PANTOPRAZOLE SODIUM 40 MG: 40 INJECTION, POWDER, FOR SOLUTION INTRAVENOUS at 21:53

## 2022-05-01 RX ADMIN — LATANOPROST 1 DROP: 50 SOLUTION OPHTHALMIC at 18:02

## 2022-05-01 RX ADMIN — SODIUM CHLORIDE, PRESERVATIVE FREE 10 ML: 5 INJECTION INTRAVENOUS at 21:55

## 2022-05-01 NOTE — PROGRESS NOTES
Patient admitted this morning, seen for follow-up. Patient feeling better, no more rectal bleeding  Pt AAOx2, poor historian     Visit Vitals  BP (!) 100/39   Pulse 76   Temp 98.1 °F (36.7 °C)   Resp 11   Wt 63 kg (139 lb)   SpO2 100%   BMI 20.53 kg/m²     Exam  General:  Alert, cooperative, no acute distress    Pulmonary:  CTA Bilaterally. No Wheezing/Rales. Cardiovascular: Regular rate and Rhythm. GI:  Soft, Non distended, Non tender. + Bowel sounds. Extremities:  No edema. No calf tenderness. Psych: Not anxious or agitated. Neurologic: Alert and oriented X 2. No acute neuro deficits. Labs, chart, and vitals noted    Will monitor H/H  GI in put noted   Start PPPI, full liquid diet and monitor  D/w pt and wife at bedside       Disclaimer: Sections of this note are dictated using utilizing voice recognition software. Minor typographical errors may be present. If questions arise, please do not hesitate to contact me or call our department. no

## 2022-05-01 NOTE — PROGRESS NOTES
Problem: Falls - Risk of  Goal: *Absence of Falls  Description: Document Fariba Taylor Fall Risk and appropriate interventions in the flowsheet. Outcome: Progressing Towards Goal  Note: Fall Risk Interventions:  Mobility Interventions: Bed/chair exit alarm    Mentation Interventions: Bed/chair exit alarm         Elimination Interventions: Bed/chair exit alarm,Call light in reach,Patient to call for help with toileting needs    History of Falls Interventions: Bed/chair exit alarm         Problem: Patient Education: Go to Patient Education Activity  Goal: Patient/Family Education  Outcome: Progressing Towards Goal     Problem: Pain  Goal: *Control of Pain  Outcome: Progressing Towards Goal     Problem: Pressure Injury - Risk of  Goal: *Prevention of pressure injury  Description: Document Rio Scale and appropriate interventions in the flowsheet. Outcome: Progressing Towards Goal  Note: Pressure Injury Interventions:  Sensory Interventions: Assess changes in LOC,Assess need for specialty bed,Keep linens dry and wrinkle-free,Minimize linen layers,Pressure redistribution bed/mattress (bed type)         Activity Interventions: Assess need for specialty bed,Pressure redistribution bed/mattress(bed type)    Mobility Interventions: Assess need for specialty bed,Pressure redistribution bed/mattress (bed type),Turn and reposition approx.  every two hours(pillow and wedges)    Nutrition Interventions: Document food/fluid/supplement intake                     Problem: Patient Education: Go to Patient Education Activity  Goal: Patient/Family Education  Outcome: Progressing Towards Goal     Problem: Patient Education: Go to Patient Education Activity  Goal: Patient/Family Education  Outcome: Progressing Towards Goal     Problem: Upper and Lower GI Bleed: Day 1  Goal: Off Pathway (Use only if patient is Off Pathway)  Outcome: Progressing Towards Goal  Goal: Activity/Safety  Outcome: Progressing Towards Goal

## 2022-05-01 NOTE — ROUTINE PROCESS
TRANSFER - IN REPORT:    Verbal report received from Dianna Aragon RN(name) on [facility] [employer].  being received from [facility](unit) for routine progression of care      Report consisted of patients Situation, Background, Assessment and   Recommendations(SBAR). Information from the following report(s) SBAR, Kardex and MAR was reviewed with the receiving nurse. Opportunity for questions and clarification was provided. Assessment completed upon patients arrival to unit and care assumed.

## 2022-05-01 NOTE — PROGRESS NOTES
Problem: Falls - Risk of  Goal: *Absence of Falls  Description: Document Hayley Lyon Fall Risk and appropriate interventions in the flowsheet. Outcome: Progressing Towards Goal  Note: Fall Risk Interventions:  Mobility Interventions: Bed/chair exit alarm    Mentation Interventions: Bed/chair exit alarm         Elimination Interventions: Bed/chair exit alarm,Call light in reach,Patient to call for help with toileting needs    History of Falls Interventions: Bed/chair exit alarm         Problem: Pain  Goal: *Control of Pain  Outcome: Progressing Towards Goal  Goal: *PALLIATIVE CARE:  Alleviation of Pain  Outcome: Progressing Towards Goal     Problem: Pressure Injury - Risk of  Goal: *Prevention of pressure injury  Description: Document Rio Scale and appropriate interventions in the flowsheet. Outcome: Progressing Towards Goal  Note: Pressure Injury Interventions:  Sensory Interventions: Assess changes in LOC,Assess need for specialty bed,Keep linens dry and wrinkle-free,Minimize linen layers,Pressure redistribution bed/mattress (bed type)         Activity Interventions: Assess need for specialty bed,Pressure redistribution bed/mattress(bed type)    Mobility Interventions: Assess need for specialty bed,Pressure redistribution bed/mattress (bed type),Turn and reposition approx.  every two hours(pillow and wedges)    Nutrition Interventions: Document food/fluid/supplement intake                     Problem: Upper and Lower GI Bleed: Day 1  Goal: Off Pathway (Use only if patient is Off Pathway)  Outcome: Progressing Towards Goal  Goal: Activity/Safety  Outcome: Progressing Towards Goal  Goal: Consults, if ordered  Outcome: Progressing Towards Goal  Goal: Diagnostic Test/Procedures  Outcome: Progressing Towards Goal  Goal: Nutrition/Diet  Outcome: Progressing Towards Goal  Goal: Discharge Planning  Outcome: Progressing Towards Goal  Goal: Medications  Outcome: Progressing Towards Goal  Goal: Respiratory  Outcome: Progressing Towards Goal  Goal: Treatments/Interventions/Procedures  Outcome: Progressing Towards Goal  Goal: Psychosocial  Outcome: Progressing Towards Goal  Goal: *Optimal pain control at patient's stated goal  Outcome: Progressing Towards Goal  Goal: *Hemodynamically stable  Outcome: Progressing Towards Goal  Goal: *Demonstrates progressive activity  Outcome: Progressing Towards Goal

## 2022-05-01 NOTE — ED NOTES
Spoke with Nurse Marianela Roberts at [facility] and nursing. She is aware of patient admission. Also clarified code status. Patient face sheet from them said patient was DNR but doctors note said full code. She stated patient is a full code. DNR paperwork was never signed. Marcus Arce

## 2022-05-01 NOTE — CONSULTS
[email or web address]  [phone]    GASTROENTEROLOGY CONSULT      Impression:   1. Maroon colored stool in ED - 1 episode of painless hematochezia 4/30.   2. Anemia - FOB positive, Hgb 6.9 upon ED presentation w/ 1 unit transfused resulting in increase to 7.7, repeat Hgb this AM stable. Platelets/INR nml. CTA 5/1 unrevealing. May have been diverticular that has thus resolved. 6/2017 colonoscopy significant for mild diverticulosis of the cecum. 3. Prostate cancer    4. ? Dementia   5. CVA 2012  6. DM  7. Small AAA    5/1 CTA ABD PELVIS IMPRESSION:  No active intraluminal GI bleed identified.   Significant stool burden in the rectum with associated rectal wall thickening suggesting secondary proctitis. Correlate clinically for fecal impaction. No other acute bowel abnormality elsewhere identified.   Improving metastatic adenopathy in the retroperitoneum and left pelvis. Infrarenal abdominal aortic ectasia measuring up to 2.9 cm  Additional incidentals as above        Plan:     1. No plans for GI procedures at this time given stable H/H, no recurrent bleeding, and significant age/comorbidities. 2. Consider NM bleeding scan if bleeding reoccurs with possible IR consult if positive. 3. IV PPI BID. 4. Monitor H/H, transfuse if Hgb <7.  5. May benefit from fleet enema given concerns for rectal stool burden CTA. 10. Niraj Garrett for diet from GI perspective. 7. Continue medical management per primary team.    Chief Complaint: anemia, hematochezia      HPI:  Shani Douglass. is a 80 y.o. male w/ PMH anemia, prostate cancer, CVA, ?dementia, who I am being asked to see in consultation for an opinion regarding the above. Patient presented to the ED 4/30 with hematochezia. Patient reported one episode of maroon colored stool with notable Hgb of 6.5 in ED. Hgb improved to 7.7 after 1 unit of PRBC was transfused. CTA abdomen pelvis performed which showed no obvious signs of active bleeding.  RN reports no recurrent bleeding per report. Denies n/v, or abdominal pain. No history of GIB. History is limited as patient is confused at baseline - may have hx of dementia. PMH:   Past Medical History:   Diagnosis Date    Diabetes ([facility]     no meds    Hypercholesteremia     Personal history of malignant neoplasm of prostate     Prostate cancer ([facility]     Unspecified cerebral artery occlusion with cerebral infarction 2012    \"TIA\"       PSH:   Past Surgical History:   Procedure Laterality Date    COLONOSCOPY N/A 6/22/2017    COLONOSCOPY performed by Shruti Curiel MD at [address] HX COLONOSCOPY      HX CRYOABLATION OF THE PROSTATE         Social HX:   Social History     Socioeconomic History    Marital status:      Spouse name: Not on file    Number of children: Not on file    Years of education: Not on file    Highest education level: Not on file   Occupational History    Not on file   Tobacco Use    Smoking status: Former Smoker    Smokeless tobacco: Never Used   Vaping Use    Vaping Use: Never used   Substance and Sexual Activity    Alcohol use: No    Drug use: No    Sexual activity: Not on file   Other Topics Concern    Not on file   Social History Narrative    Not on file     Social Determinants of Health     Financial Resource Strain:     Difficulty of Paying Living Expenses: Not on file   Food Insecurity:     Worried About [address] in the Last Year: Not on file    [address] in the Last Year: Not on file   Transportation Needs:     Lack of Transportation (Medical): Not on file    Lack of Transportation (Non-Medical):  Not on file   Physical Activity:     Days of Exercise per Week: Not on file    Minutes of Exercise per Session: Not on file   Stress:     Feeling of Stress : Not on file   Social Connections:     Frequency of Communication with Friends and Family: Not on file    Frequency of Social Gatherings with Friends and Family: Not on file    Attends Christianity Services: Not on file   [facility] [employer] or Organizations: Not on file    Attends Club or Organization Meetings: Not on file    Marital Status: Not on file   Intimate Partner Violence:     Fear of Current or Ex-Partner: Not on file    Emotionally Abused: Not on file    Physically Abused: Not on file    Sexually Abused: Not on file   Housing Stability:     Unable to Pay for Housing in the Last Year: Not on file    Number of [place] in the Last Year: Not on file    Unstable Housing in the Last Year: Not on file       FHX:   Family History   Family history unknown: Yes       Allergy:   No Known Allergies    Patient Active Problem List   Diagnosis Code    [facility] C61    Unspecified cerebral artery occlusion with cerebral infarction I63.50    Hypercholesteremia E78.00    Personal history of malignant neoplasm of prostate Z85.46    Chronic kidney disease due to benign hypertension I12.9    Stage 3 chronic kidney disease (HCC) C58.23    Diastolic dysfunction X99.82    Keratoconjunctivitis sicca, not specified as Sjogren's, bilateral H16.223    Left ventricular hypertrophy I51.7    Primary open-angle glaucoma, bilateral, moderate stage H40.1132    Tricuspid regurgitation I07.1    Weakness R53.1    GI bleed K92.2       Home Medications:     Medications Prior to Admission   Medication Sig    vit C/E/zinc ox/james/lut/zeax (ICAPS AREDS2 PO) Take  by mouth.  polyethylene glycol (Miralax) 17 gram/dose powder Take 17 g by mouth daily.  atorvastatin (LIPITOR) 40 mg tablet Take  by mouth daily.  aspirin 81 mg chewable tablet Take 81 mg by mouth daily.  latanoprost (XALATAN) 0.005 % ophthalmic solution     Ca Cit-D3-K-Mgox-stron-sod bor (PROSTEON) 250 mg calcium -500 unit tab Take  by mouth.  cycloSPORINE (RESTASIS) 0.05 % ophthalmic emulsion Administer 1 Drop to both eyes two (2) times a day.  Cholecalciferol, Vitamin D3, (VITAMIN D3) 1,000 unit Cap Take  by mouth.       vit d-bgmjcig-mrqv-rutin-hb111 (BIOFLEX) 805-43-19-06 mg Tab Take  by mouth.  ACETAMINOPHEN (TYLENOL ARTHRITIS PO) Take 2 Tabs by mouth daily.  ASCORBATE CALCIUM (VITAMIN C PO) Take  by mouth. Review of Systems:     Constitutional: No fevers, chills, weight loss, fatigue. Skin: No rashes, pruritis, jaundice, ulcerations, erythema. HENT: No headaches, nosebleeds, sinus pressure, rhinorrhea, sore throat. Eyes: No visual changes, blurred vision, eye pain, photophobia, jaundice. Cardiovascular: No chest pain, heart palpitations. Respiratory: No cough, SOB, wheezing, chest discomfort, orthopnea. Gastrointestinal: See HPI    Genitourinary: No dysuria, bleeding, discharge, pyuria. Musculoskeletal: No weakness, arthralgias, wasting. Endo: No sweats. Heme: No bruising, easy bleeding. Allergies: As noted. Neurological: Cranial nerves intact. Alert and oriented. Gait not assessed. Psychiatric:  No anxiety, depression, hallucinations. Visit Vitals  /86   Pulse 83   Temp 98.2 °F (36.8 °C)   Resp 12   Wt 63 kg (139 lb)   SpO2 100%   BMI 20.53 kg/m²       Physical Assessment:     constitutional: well developed, well nourished, normal habitus, in no acute distress. skin: no rashes, ulcers, icterus or other lesions  eyes: normal conjunctivae and lids; no jaundice pupils: normal  HEENT: normocephalic, atraumatic  neck: supple, normal ROM   respiratory: normal chest excursion; no intercostal retraction or accessory muscle use; clear to ascultation bilaterally    cardiovascular: regular rate and rhythm, no murmur, rub or gallop.   abdominal: non-distended, active bowel sounds, soft, non-tender, non-acute, no palpable masses or hernias. liver/spleen: not palpable. extremities: no significant deformity or contracture, no edema.  Gait not assessed   neurologic: cranial nerves: grossly normal.  psychiatric: confused, at baseline       Basic Metabolic Profile   Recent Labs 04/30/22  2350      K 4.4      CO2 30   BUN 18   *   CA 9.7   MG 2.1         CBC w/Diff    Recent Labs     05/01/22  0738   WBC 3.7*   RBC 2.86*   HGB 7.8*   HCT 25.4*   MCV 88.8   MCH 27.3   MCHC 30.7*   RDW 18.2*       Recent Labs     04/30/22  2350   GRANS 41   LYMPH 51   EOS 2        Hepatic Function   Recent Labs     04/30/22  2350   ALB 2.6*   TP 8.0   TBILI 0.5   AP 64        Coags   No results for input(s): PTP, INR, APTT, INREXT in the last 72 hours. Keith Otero PA-C.   05/01/22, 9:24 AM    [facility]  [email or web address]  Phone: [phone]  Pager: [phone]

## 2022-05-01 NOTE — PROGRESS NOTES
Problem: Dysphagia (Adult)  Goal: *Acute Goals and Plan of Care (Insert Text)  Description: Patient will:  1. Tolerate PO trials with 0 s/s overt distress in 4/5 trials  2. Utilize compensatory swallow strategies/maneuvers (decrease bite/sip, size/rate, alt. liq/sol) with min cues in 4/5 trials  3. Perform oral-motor/laryngeal exercises to increase oropharyngeal swallow function with min cues  4. Complete an objective swallow study (i.e., MBSS) to assess swallow integrity, r/o aspiration, and determine of safest LRD, min A    Recommend:   Soft and bite sized diet with thin liquids   Meds float in puree  Aspiration precautions  HOB >45 degrees during all intake and for at least 30 min after intake  Small bites/sips, Feed slowly, alternating bites/sips   Oral care three times daily     Outcome: Progressing Towards Goal   SPEECH LANGUAGE PATHOLOGY BEDSIDE SWALLOW EVALUATION    Patient: James Santos (80 y.o. male)  Date: 5/1/2022  Primary Diagnosis: GI bleed [K92.2]  Precautions: aspiration     PLOF: as per H&P    ASSESSMENT :  Based on the objective data described below, the patient presents with mild oral dysphagia. Patient A&Oxperson only. Vocal quality low volume and hoarse. OME (oral mech exam) revealed generalized weakness for all tasks performed. PO trials consumed: thin +/- straw and regular solid. No s/sx of aspiration/penetration for all consistencies consumed. Prolonged mastication and bolus manipulation noted with regular solid secondary to dentition. Lingual residue observed in which patient was able to clear with multiple thin liquid washes. Patient observed to become fatigued as the trials progressed. Patient on full liquid diet at this time, but appears safe for diet upgrade to soft and bite sized with thin liquids. Requires feeding assistance. Meds float or crushed in puree. SLP following. D/w RN. Patient will benefit from skilled intervention to address the above impairments.   Patient's rehabilitation potential is considered to be Fair  Factors which may influence rehabilitation potential include:   []            None noted  [x]            Mental ability/status  [x]            Medical condition  []            Home/family situation and support systems  [x]            Safety awareness  []            Pain tolerance/management  []            Other:      PLAN :  Recommendations and Planned Interventions:  See above  Frequency/Duration: Patient will be followed by speech-language pathology 1-2 times per day/3-5 days per week to address goals. Discharge Recommendations: To Be Determined     SUBJECTIVE:   Patient stated What.     OBJECTIVE:     Past Medical History:   Diagnosis Date    Diabetes ([facility] [address]     no meds    Hypercholesteremia     Personal history of malignant neoplasm of prostate     Prostate cancer ([facility] [address] [facility]     Unspecified cerebral artery occlusion with cerebral infarction 2012    \"TIA\"     Past Surgical History:   Procedure Laterality Date    COLONOSCOPY N/A 6/22/2017    COLONOSCOPY performed by Vero Mansfield MD at [facility]    HX COLONOSCOPY      HX CRYOABLATION OF THE PROSTATE       Home Situation:   Home Situation  Home Environment: Rehabilitation facility  One/Two Story Residence: One story  Living Alone: No  Support Systems: Spouse/Significant Other,Assisted Living  Patient Expects to be Discharged to[de-identified] Rehab hospital/unit acute  Current DME Used/Available at Home: Hospital bed  Diet prior to admission: unknown  Current Diet:  full liquid   Cognitive and Communication Status:  Neurologic State: Alert,Confused,Drowsy,Eyes open to voice  Orientation Level: Oriented to person,Disoriented to time,Disoriented to situation,Disoriented to place  Cognition: Follows commands,Decreased attention/concentration  Oral Assessment:  Oral Assessment  Labial: Decreased rate;Decreased seal  Dentition: Limited;Poor  Oral Hygiene: poor  Lingual: Decreased rate;Decreased strength  Velum: Unable to visualize  Mandible: No impairment  P.O. Trials:  Patient Position: [facility] at [facility]  Vocal quality prior to P.O.: Breathy;Low volume  Consistency Presented: Solid; Thin liquid  How Presented: Self-fed/presented;Straw;Successive swallows;SLP-fed/presented  Bolus Acceptance: Impaired  Bolus Formation/Control: Impaired  Type of Impairment: Mastication  Propulsion: Delayed (# of seconds)  Oral Residue: Less than 10% of bolus; Lingual  Initiation of Swallow: No impairment  Laryngeal Elevation: Decreased;Weak  Aspiration Signs/Symptoms: None  Pharyngeal Phase Characteristics: No impairment, issues, or problems   Effective Modifications: Alternate liquids/solids;Small sips and bites  Cues for Modifications: Moderate  Oral Phase Severity: Mild  Pharyngeal Phase Severity : No impairment  PAIN:  Pain level pre-treatment: non reported/10   Pain level post-treatment: non reported/10   After treatment:   []            Patient left in no apparent distress sitting up in chair  [x]            Patient left in no apparent distress in bed  [x]            Call bell left within reach  [x]            Nursing notified  []            Family present  []            Caregiver present  []            Bed alarm activated    COMMUNICATION/EDUCATION:   [x]            Aspiration precautions; swallow safety; compensatory techniques. []            Patient/family have participated as able in goal setting and plan of care. []            Patient/family agree to work toward stated goals and plan of care. []            Patient understands intent and goals of therapy; neutral about participation. [x]            Patient unable to participate in goal setting/plan of care; educ ongoing with interdisciplinary staff  []         Posted safety precautions in patient's room.     Tello Ruiz M.S., CFY-SLP  Speech-Language Pathologist

## 2022-05-01 NOTE — ROUTINE PROCESS
TRANSFER - OUT REPORT:    Verbal report given to [employer] (name) on Meenakshi Mandujano.  being transferred to Room 353 (unit) for routine progression of care       Report consisted of patients Situation, Background, Assessment and   Recommendations(SBAR). Information from the following report(s) SBAR was reviewed with the receiving nurse. Lines:   Peripheral IV 04/30/22 Right Forearm (Active)       Peripheral IV 05/01/22 Left Antecubital (Active)        Opportunity for questions and clarification was provided.       Patient transported with:   Monitor

## 2022-05-01 NOTE — ED PROVIDER NOTES
EMERGENCY DEPARTMENT HISTORY AND PHYSICAL EXAM    11:48 PM  Date: (Not on file)  Patient Name: Gogo Farley. History of Presenting Illness     Chief Complaint   Patient presents with    Rectal Bleeding        History Provided By: Patient and EMS    HPI: Gogo Nelson is a 80 y.o. male with history of diabetes and prostate cancer. Patient was brought in by ambulance from nursing facility for rectal bleed and low hemoglobin that started today. His hemoglobin was 6.5 today patient is denying any pain or any symptoms. Upon chart review on a CT scan of his abdomen that he had 3 months ago he had a 3 cm stable abdominal aortic aneurysm. No previous history documented of a GI bleed. Location:  Severity:  Timing/course: Onset/Duration:     PCP: ARIANA Berry    Past History     Past Medical History:  Past Medical History:   Diagnosis Date    Diabetes ([facility] [address]     no meds    Hypercholesteremia     Personal history of malignant neoplasm of prostate     Prostate cancer ([facility] [address]     Unspecified cerebral artery occlusion with cerebral infarction 2012    \"TIA\"       Past Surgical History:  Past Surgical History:   Procedure Laterality Date    COLONOSCOPY N/A 6/22/2017    COLONOSCOPY performed by Tika Stiles MD at [address] HX COLONOSCOPY      HX CRYOABLATION OF THE PROSTATE         Family History:  Family History   Family history unknown: Yes       Social History:  Social History     Tobacco Use    Smoking status: Former Smoker    Smokeless tobacco: Never Used   Vaping Use    Vaping Use: Never used   Substance Use Topics    Alcohol use: No    Drug use: No       Allergies:  No Known Allergies    Review of Systems   Review of Systems   Unable to perform ROS: Dementia        Physical Exam     No data found. Physical Exam  Vitals and nursing note reviewed. Constitutional:       Appearance: He is not ill-appearing or diaphoretic. HENT:      Head: Normocephalic and atraumatic.    Eyes: Extraocular Movements: Extraocular movements intact. Cardiovascular:      Rate and Rhythm: Normal rate. Pulses: Normal pulses. Pulmonary:      Effort: Pulmonary effort is normal. No respiratory distress. Abdominal:      Palpations: Abdomen is soft. Tenderness: There is no abdominal tenderness. Genitourinary:     Rectum: Guaiac result positive. Musculoskeletal:         General: No deformity. Cervical back: Normal range of motion and neck supple. Skin:     General: Skin is warm and dry. Neurological:      General: No focal deficit present. Mental Status: He is alert. Mental status is at baseline. Psychiatric:         Mood and Affect: Mood normal.         Behavior: Behavior normal.         Diagnostic Study Results     Labs -  Recent Results (from the past 12 hour(s))   METABOLIC PANEL, BASIC    Collection Time: 04/30/22  3:55 PM   Result Value Ref Range    Potassium 4.2 3.5 - 5.1 mEq/L    Chloride 101 98 - 107 mEq/L    Sodium 139 136 - 145 mEq/L    CO2 30 21 - 32 mEq/L    Glucose 140 (H) 74 - 106 mg/dl    BUN 19 7 - 25 mg/dl    Creatinine 1.2 0.6 - 1.3 mg/dl    GFR est AA >60      GFR est non-AA 60      Calcium 8.9 8.5 - 10.1 mg/dl    Anion gap 8 5 - 15 mmol/L   CBC WITH AUTOMATED DIFF    Collection Time: 04/30/22  3:55 PM   Result Value Ref Range    WBC 3.5 (L) 4.0 - 11.0 1000/mm3    RBC 2.54 (L) 3.80 - 5.70 M/uL    HGB 6.9 (LL) 12.4 - 17.2 gm/dl    HCT 23.1 (L) 37.0 - 50.0 %    MCV 90.9 80.0 - 98.0 fL    MCH 27.2 23.0 - 34.6 pg    MCHC 29.9 (L) 30.0 - 36.0 gm/dl    PLATELET 268 400 - 168 1000/mm3    MPV 12.2 (H) 6.0 - 10.0 fL    RDW-SD 62.9 (H) 35.1 - 43.9      NRBC 0 0 - 0      IMMATURE GRANULOCYTES 0.6 0.0 - 3.0 %    NEUTROPHILS 39.8 34 - 64 %    LYMPHOCYTES 53.6 (H) 28 - 48 %    MONOCYTES 4.3 1 - 13 %    EOSINOPHILS 1.4 0 - 5 %    BASOPHILS 0.3 0 - 3 %       Radiologic Studies -   No results found.       Medical Decision Making     ED Course: Progress Notes, Reevaluation, and Consults:    11:48 PM Initial assessment performed. The patients presenting problems have been discussed, and they/their family are in agreement with the care plan formulated and outlined with them. I have encouraged them to ask questions as they arise throughout their visit. 12:09 AM  CT tech was called and was told that we need to CT emergently. We stated that he needs to get the room ready for some parental paperwork before he can come get the patient, asked nursing team to take the patient immediately to CT due to concern for aortoenteric fistula causing the GI bleed. 12:38 AM  Patient is back from CT. Remained stable. Will call [facility] to get a stat read on the CTA.    1:27 AM  Discussed with hospitalist Dr. Annabella Pozo, patient has been accepted. Will page GI as well. 1:35 AM  Case discussed with GI Dr. Esperanza Gaming, will see the patient tomorrow. Provider Notes (Medical Decision Making): 26-year-old gentleman brought in by ambulance from nursing facility for rectal bleeding and low hemoglobin. He is well-appearing on exam and not in distress, vital signs are unremarkable. Abdomen is soft and nontender. Rectal exam revealed maroon stool that is strongly guaiac positive. Given the patient history of AAA CTA of his abdomen pelvis was immediately ordered as well as a unit of blood. 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 We will type and cross for 2 more units. Also ordered IV Protonix. If there is any evidence of worsening of his AAA or aortic enteric fistula will transfer immediately to [facility] otherwise can be admitted to [facility]. Chart review and per the patient's paperwork he is a full code as of last month. Globin 7.7 but the patient had already gotten a unit of blood upon his initial presentation with spontaneous rectal bleeding in the history of AAA. We will hold off on further transfusion unless necessary. Serial H&H's. Labs otherwise unremarkable.     Procedures: Critical Care Time: Upon my evaluation, this patient had a high probability of imminent or life-threatening deterioration due to GI bleed, which required my direct attention, intervention, and personal management. I have personally provided 60 minutes of critical care time exclusive of time spent on separately billable procedures. Time includes review of laboratory data, radiology results, discussion with consultants, and monitoring for potential decompensation. Interventions were performed as documented above. Lou Lopez MD  1:28 AM        Vital Signs-Reviewed the patient's vital signs. Reviewed pt's pulse ox reading. EKG: Interpreted by the EP. Time Interpreted:    Rate:    Rhythm:    Interpretation:   Comparison:     Records Reviewed: Nursing Notes, Old Medical Records, Previous Radiology Studies and Previous Laboratory Studies (Time of Review: 11:48 PM)  -I am the first provider for this patient.  -I reviewed the vital signs, available nursing notes, past medical history, past surgical history, family history and social history. Current Outpatient Medications   Medication Sig Dispense Refill    vit C/E/zinc ox/james/lut/zeax (ICAPS AREDS2 PO) Take  by mouth.  polyethylene glycol (Miralax) 17 gram/dose powder Take 17 g by mouth daily.  atorvastatin (LIPITOR) 40 mg tablet Take  by mouth daily.  aspirin 81 mg chewable tablet Take 81 mg by mouth daily.  latanoprost (XALATAN) 0.005 % ophthalmic solution       Ca Cit-D3-K-Mgox-stron-sod bor (PROSTEON) 250 mg calcium -500 unit tab Take  by mouth.  cycloSPORINE (RESTASIS) 0.05 % ophthalmic emulsion Administer 1 Drop to both eyes two (2) times a day.  Cholecalciferol, Vitamin D3, (VITAMIN D3) 1,000 unit Cap Take  by mouth.  vit b-xagfnsc-bhpv-rutin-hb111 (BIOFLEX) 212-52-87-97 mg Tab Take  by mouth.  ACETAMINOPHEN (TYLENOL ARTHRITIS PO) Take 2 Tabs by mouth daily.       ASCORBATE CALCIUM (VITAMIN C PO) Take  by mouth. Clinical Impression     Clinical Impression: No diagnosis found. Disposition: This note was dictated utilizing voice recognition software which may lead to typographical errors. I apologize in advance if the situation occurs. If questions arise please do not hesitate to contact me or call our department.     Claudetta Hensen, MD  11:48 PM

## 2022-05-01 NOTE — H&P
History & Physical    Patient: Amy Barron MRN: [id number]  CSN: [id number]    YOB: 1927  Age: 80 y.o. Sex: male      DOA: 4/30/2022    Chief Complaint:   Chief Complaint   Patient presents with    Rectal Bleeding          HPI:     Amy Barron is a 80 y.o. male w/ PMH prostate cancer of unknown staging Per last urology note with chronic Mahmood, diabetes, CVA, likely some dementia who presented to the ED at [facility] from nursing facility with rectal bleeding. He was found to have hemoglobin of 6.9 on presentation there. He was transfused 1 unit of blood and was asked to be transferred here for further evaluation. In the ED there, he was noted to have maroon stool. No significant vital sign changes including tachycardia or hypotension. No significant mental status changes. He had a CTA of his abdomen pelvis done which showed no obvious signs of active bleeding. Currently, the patient feels okay, although he is a little confused. He states that he has been having some trouble swallowing food. He stated that he did not have any chest pain, shortness of breath, fevers, chills, nausea, vomiting, dizziness, lightheadedness, dysuria, new rashes.     Further history limited from the patient because he did not know why he is in the hospital.    Past Medical History:   Diagnosis Date    Diabetes ([facility] [address]     no meds    Hypercholesteremia     Personal history of malignant neoplasm of prostate     Prostate cancer ([facility] [address]     Unspecified cerebral artery occlusion with cerebral infarction 2012    \"TIA\"       Past Surgical History:   Procedure Laterality Date    COLONOSCOPY N/A 6/22/2017    COLONOSCOPY performed by Shruti Curiel MD at [address] HX COLONOSCOPY      HX CRYOABLATION OF THE PROSTATE         Family History   Family history unknown: Yes       Social History     Socioeconomic History    Marital status:    Tobacco Use    Smoking status: Former Smoker    Smokeless tobacco: Never Used   Vaping Use    Vaping Use: Never used   Substance and Sexual Activity    Alcohol use: No    Drug use: No       Prior to Admission medications    Medication Sig Start Date End Date Taking? Authorizing Provider   vit C/E/zinc ox/james/lut/zeax (ICAPS AREDS2 PO) Take  by mouth. Provider, Historical   polyethylene glycol (Miralax) 17 gram/dose powder Take 17 g by mouth daily. Provider, Historical   atorvastatin (LIPITOR) 40 mg tablet Take  by mouth daily. Provider, Historical   aspirin 81 mg chewable tablet Take 81 mg by mouth daily. Provider, Historical   latanoprost (XALATAN) 0.005 % ophthalmic solution  20   Provider, Historical   Ca Cit-D3-K-Mgox-stron-sod bor (PROSTEON) 250 mg calcium -500 unit tab Take  by mouth. Provider, Historical   cycloSPORINE (RESTASIS) 0.05 % ophthalmic emulsion Administer 1 Drop to both eyes two (2) times a day. Provider, Historical   Cholecalciferol, Vitamin D3, (VITAMIN D3) 1,000 unit Cap Take  by mouth. Provider, Historical   vit m-kspllmq-bedn-rutin-hb111 (BIOFLEX) 745-53-43-52 mg Tab Take  by mouth. Provider, Historical   ACETAMINOPHEN (TYLENOL ARTHRITIS PO) Take 2 Tabs by mouth daily. Provider, Historical   ASCORBATE CALCIUM (VITAMIN C PO) Take  by mouth. Provider, Historical       No Known Allergies      Review of Systems limited other than as mentioned above due to patient's confusion      Physical Exam:     Physical Exam:  Visit Vitals  /86   Pulse 83   Temp 98.2 °F (36.8 °C)   Resp 12   Wt 63 kg (139 lb)   SpO2 100%   BMI 20.53 kg/m²      O2 Device: None (Room air)    Temp (24hrs), Av.6 °F (37 °C), Min:98.2 °F (36.8 °C), Max:98.8 °F (37.1 °C)     1901 -  0700  In: 279   Out: -    No intake/output data recorded. General: AOX1, NAD. Thin. HEENT: NCAT, no scleral icterus, PERRL. Edentulous. Neck: No LAD, no JVD  Lungs: CTAB, no wheezing, rales, or crackles. In no respiratory distress.   CV: RRR, S1/S2 normal. No MRG. Abdomen: Soft, NT, ND. Normoactive bowel sounds. Extremities: No cyanosis or edema. Skin: No rashes or lesions  Neuro: Aox1, no focal motor deficits    Labs Reviewed: All lab results for the last 24 hours reviewed. CT, CXR and EKG    Procedures/imaging: see electronic medical records for all procedures/Xrays and details which were not copied into this note but were reviewed prior to creation of Plan      Assessment/Plan     Active Problems:    GI bleed (5/1/2022)       Problem:  · GI bleed. Possibly upper GI bleed. No nausea or vomiting reported by patient. No abdominal pain. CT abdomen pelvis without signs of active bleeding. Transfused 1 unit and responded appropriately. · Prostate cancer with chronic Mahmood  · Dementia  · Diabetes type 2    Plan:  · Keep n.p.o. for now. Follow-up GI recs in the morning. · Follow up H/h in AM. Ordering coags. · SLP eval if that is no plan for procedures by GI. Patient states he has been having trouble swallowing. · Sliding scale insulin  · Neurochecks every 4 for now  · Continuing atorvastatin    DVT/GI Prophylaxis: SCD's    Discussed with patient at bedside about hospital admission and my plan care, they understood and agree with my plan care. Melanie Aleman MD  5/1/2022 5:09 AM    Disclaimer: Sections of this note are dictated using utilizing voice recognition software. Minor typographical errors may be present. If questions arise, please do not hesitate to contact me or call our department.

## 2022-05-01 NOTE — ROUTINE PROCESS
Bedside and Verbal shift change report given to [address] (oncoming nurse) by Jorge Alberto Deluna RN (offgoing nurse). Report included the following information SBAR, Kardex, Intake/Output, MAR, Recent Results and Cardiac Rhythm NSR-sinus tach. 0845: Lipitor held, pt NPO.    1200: Pt tolerated lunch well. Spouse at bedside, new phone number updated in chart. Bedside and Verbal shift change report given to [address] [place] [address] (oncoming nurse) by [address] (offgoing nurse). Report included the following information SBAR, Kardex, Intake/Output, MAR, Recent Results and Cardiac Rhythm NSR.

## 2022-05-02 LAB
ANION GAP SERPL CALC-SCNC: 2 MMOL/L (ref 3–18)
BASOPHILS # BLD: 0 K/UL (ref 0–0.1)
BASOPHILS NFR BLD: 0 % (ref 0–2)
BUN SERPL-MCNC: 18 MG/DL (ref 7–18)
BUN/CREAT SERPL: 13 (ref 12–20)
CALCIUM SERPL-MCNC: 9.5 MG/DL (ref 8.5–10.1)
CHLORIDE SERPL-SCNC: 109 MMOL/L (ref 100–111)
CO2 SERPL-SCNC: 30 MMOL/L (ref 21–32)
CREAT SERPL-MCNC: 1.38 MG/DL (ref 0.6–1.3)
DIFFERENTIAL METHOD BLD: ABNORMAL
EOSINOPHIL # BLD: 0.1 K/UL (ref 0–0.4)
EOSINOPHIL NFR BLD: 2 % (ref 0–5)
ERYTHROCYTE [DISTWIDTH] IN BLOOD BY AUTOMATED COUNT: 18.4 % (ref 11.6–14.5)
GLUCOSE BLD STRIP.AUTO-MCNC: 101 MG/DL (ref 70–110)
GLUCOSE BLD STRIP.AUTO-MCNC: 116 MG/DL (ref 70–110)
GLUCOSE BLD STRIP.AUTO-MCNC: 127 MG/DL (ref 70–110)
GLUCOSE BLD STRIP.AUTO-MCNC: 130 MG/DL (ref 70–110)
GLUCOSE SERPL-MCNC: 102 MG/DL (ref 74–99)
HCT VFR BLD AUTO: 25.3 % (ref 36–48)
HCT VFR BLD AUTO: 26.1 % (ref 36–48)
HGB BLD-MCNC: 7.6 G/DL (ref 13–16)
HGB BLD-MCNC: 7.9 G/DL (ref 13–16)
IMM GRANULOCYTES # BLD AUTO: 0 K/UL (ref 0–0.04)
IMM GRANULOCYTES NFR BLD AUTO: 1 % (ref 0–0.5)
LYMPHOCYTES # BLD: 1.6 K/UL (ref 0.9–3.6)
LYMPHOCYTES NFR BLD: 44 % (ref 21–52)
MCH RBC QN AUTO: 26.7 PG (ref 24–34)
MCHC RBC AUTO-ENTMCNC: 30 G/DL (ref 31–37)
MCV RBC AUTO: 88.8 FL (ref 78–100)
MONOCYTES # BLD: 0.2 K/UL (ref 0.05–1.2)
MONOCYTES NFR BLD: 6 % (ref 3–10)
NEUTS SEG # BLD: 1.7 K/UL (ref 1.8–8)
NEUTS SEG NFR BLD: 47 % (ref 40–73)
NRBC # BLD: 0 K/UL (ref 0–0.01)
NRBC BLD-RTO: 0 PER 100 WBC
PLATELET # BLD AUTO: 190 K/UL (ref 135–420)
PMV BLD AUTO: 10.4 FL (ref 9.2–11.8)
POTASSIUM SERPL-SCNC: 4.2 MMOL/L (ref 3.5–5.5)
RBC # BLD AUTO: 2.85 M/UL (ref 4.35–5.65)
SODIUM SERPL-SCNC: 141 MMOL/L (ref 136–145)
WBC # BLD AUTO: 3.7 K/UL (ref 4.6–13.2)

## 2022-05-02 PROCEDURE — 99221 1ST HOSP IP/OBS SF/LOW 40: CPT | Performed by: NURSE PRACTITIONER

## 2022-05-02 PROCEDURE — C9113 INJ PANTOPRAZOLE SODIUM, VIA: HCPCS | Performed by: HOSPITALIST

## 2022-05-02 PROCEDURE — 2709999900 HC NON-CHARGEABLE SUPPLY

## 2022-05-02 PROCEDURE — 97161 PT EVAL LOW COMPLEX 20 MIN: CPT

## 2022-05-02 PROCEDURE — 74011000250 HC RX REV CODE- 250: Performed by: STUDENT IN AN ORGANIZED HEALTH CARE EDUCATION/TRAINING PROGRAM

## 2022-05-02 PROCEDURE — 36415 COLL VENOUS BLD VENIPUNCTURE: CPT

## 2022-05-02 PROCEDURE — 85018 HEMOGLOBIN: CPT

## 2022-05-02 PROCEDURE — 65270000046 HC RM TELEMETRY

## 2022-05-02 PROCEDURE — 92526 ORAL FUNCTION THERAPY: CPT

## 2022-05-02 PROCEDURE — 74011250636 HC RX REV CODE- 250/636: Performed by: HOSPITALIST

## 2022-05-02 PROCEDURE — 74011250637 HC RX REV CODE- 250/637: Performed by: HOSPITALIST

## 2022-05-02 PROCEDURE — 82962 GLUCOSE BLOOD TEST: CPT

## 2022-05-02 PROCEDURE — 74011250637 HC RX REV CODE- 250/637: Performed by: STUDENT IN AN ORGANIZED HEALTH CARE EDUCATION/TRAINING PROGRAM

## 2022-05-02 PROCEDURE — 85025 COMPLETE CBC W/AUTO DIFF WBC: CPT

## 2022-05-02 PROCEDURE — 80048 BASIC METABOLIC PNL TOTAL CA: CPT

## 2022-05-02 PROCEDURE — 99232 SBSQ HOSP IP/OBS MODERATE 35: CPT | Performed by: HOSPITALIST

## 2022-05-02 RX ORDER — PANTOPRAZOLE SODIUM 40 MG/1
40 TABLET, DELAYED RELEASE ORAL
Status: DISCONTINUED | OUTPATIENT
Start: 2022-05-02 | End: 2022-05-03 | Stop reason: HOSPADM

## 2022-05-02 RX ADMIN — ATORVASTATIN CALCIUM 40 MG: 40 TABLET, FILM COATED ORAL at 09:09

## 2022-05-02 RX ADMIN — SODIUM CHLORIDE, PRESERVATIVE FREE 10 ML: 5 INJECTION INTRAVENOUS at 23:58

## 2022-05-02 RX ADMIN — PANTOPRAZOLE SODIUM 40 MG: 40 TABLET, DELAYED RELEASE ORAL at 17:12

## 2022-05-02 RX ADMIN — PANTOPRAZOLE SODIUM 40 MG: 40 INJECTION, POWDER, FOR SOLUTION INTRAVENOUS at 09:09

## 2022-05-02 RX ADMIN — SODIUM CHLORIDE, PRESERVATIVE FREE 10 ML: 5 INJECTION INTRAVENOUS at 14:03

## 2022-05-02 RX ADMIN — CYCLOSPORINE 1 DROP: 0.5 EMULSION OPHTHALMIC at 17:12

## 2022-05-02 RX ADMIN — CYCLOSPORINE 1 DROP: 0.5 EMULSION OPHTHALMIC at 09:09

## 2022-05-02 RX ADMIN — LATANOPROST 1 DROP: 50 SOLUTION OPHTHALMIC at 09:09

## 2022-05-02 NOTE — PROGRESS NOTES
Called Shanice at [facility] The patient is LTC. He can return when medically stable. Notified Dr. Eliot Clements. [phone]- return call to Yuliya Meeks at [facility] [employer] [facility] Requested a copy of medication list and DNR be faxed to CM.  will continue to monitor and assist with transition of care needs.     SMITA ArizaN, RN  Care Management  Pager # [phone]

## 2022-05-02 NOTE — CONSULTS
Palliative Medicine  [facility]: [phone]  [facility]: [address] [place] [address] Way: [phone]    Patient Name: Miguel Angel Walsh. YOB: 1927    Date of Initial Consult: 5/2/2022   Reason for Consult: care decisions   Requesting Provider: Dr Deirdre Montgomery   Primary Care Physician: ARIANA Saunders      SUMMARY:   Miguel Angel Lorenz is a 80y.o. year old with a past history of prostate cancer, diabetes, CVA, dementia  who was admitted on 4/30/2022 from [facility]  with a diagnosis of rectal bleeding, Hgb 6.9 in ER. He was transfused 1 unit of PRBC's  Current medical issues leading to Palliative Medicine involvement include: 80year old male who is LTC at [facility], presents one episode of rectal bleeding. Has been seen by GI with no planned procedures. Palliative medicine is consulted for support and care decisions. PALLIATIVE DIAGNOSES:   1. Goals of care / care decisions   2. Rectal bleeding   3. Dementia   4. Debility        PLAN:   1. Goals of care Patient seen along with Ms Mina Lee, BEAU. He is alert, knows he is hospital, speech at times difficult to understand. Do not believe he can make a complex medical decision due to underlying confusion. We called his wife Reji Ye who is his legal next of kin. Reji Ye affirms DNR/DNI. She is not able to come into the hospital today, however will come in tomorrow after her doctor's appointment. He looks very debilitated to me, could answer a few questions for us but not sure he can participate in a conversation. Will discuss with wife tomorrow. Goals of care DNR/DNI limited interventions at this time. 2. Rectal bleeding currently per report no further bleeding. GI on board no planned procedures at this time. 3. Dementia very but at times speech is garbled. His dementia appears advanced   4.  Debility lives in [address] [place] [address] requires assistance with feeding PPS is low at 40 indicating a mostly bed to chair bound state and overall poor prognosis   5. Initial consult note routed to primary continuity provider  6. Communicated plan of care with: Palliative IDT, wife  Patient/Health Care Proxy Stated Goals: Prolong life      TREATMENT PREFERENCES:   Code Status: DNR/ DNI    Advance Care Planning:  [] [facility] Interdisciplinary Team has updated the ACP Navigator with [address] and Patient Capacity    Primary Decision Maker ([address]): wife Afsaneh Herman is legal next of kin     Medical Interventions: Limited additional interventions         Other:  As far as possible, the palliative care team has discussed with patient / health care proxy about goals of care / treatment preferences for patient. HISTORY:     History obtained from:  Chart and wife     CHIEF COMPLAINT: rectal bleeding     HPI/SUBJECTIVE:    The patient is:   [] Verbal and participatory  [x] Non-participatory due to:  Dementia and confusion   Please see summary     Clinical Pain Assessment (nonverbal scale for nonverbal patients): Clinical Pain Assessment  Severity: 0     Activity (Movement): Lying quietly, normal position    Duration: for how long has pt been experiencing pain (e.g., 2 days, 1 month, years)  Frequency: how often pain is an issue (e.g., several times per day, once every few days, constant)     FUNCTIONAL ASSESSMENT:     Palliative Performance Scale (PPS):  PPS: 40    ECOG  ECOG Status : Completely disabled     PSYCHOSOCIAL/SPIRITUAL SCREENING:      Any spiritual / Sikhism concerns: unable to assess   [] Yes /  [] No    Caregiver Burnout:  [] Yes /  [] No /  [x] No Caregiver Present      Anticipatory grief assessment: unable to assess for patient   [] Normal  / [] Maladaptive        REVIEW OF SYSTEMS:     Positive and pertinent negative findings in ROS are noted above in HPI. The following systems were [] reviewed / [x] unable to be reviewed as noted in HPI  Other findings are noted below.   Systems: constitutional, ears/nose/mouth/throat, respiratory, gastrointestinal, genitourinary, musculoskeletal, integumentary, neurologic, psychiatric, endocrine. Positive findings noted below. Modified ESAS Completed by: provider           Pain: 0           Dyspnea: 0                    PHYSICAL EXAM:     Wt Readings from Last 3 Encounters:   05/01/22 63 kg (139 lb)   04/26/22 78.5 kg (173 lb)   03/02/22 78.5 kg (173 lb)     Blood pressure 113/67, pulse 70, temperature 98.2 °F (36.8 °C), resp. rate 14, weight 63 kg (139 lb), SpO2 98 %. Pain:  Pain Scale 1: Numeric (0 - 10)  Pain Intensity 1: 0                 Last bowel movement: x 1 5/2/2022     Constitutional: elderly male reclining in bed in NAD   Eyes: pupils equal  ENMT: edentulous   Cardiovascular: regular rhythm  Respiratory: breathing not labored  Skin: warm, dry  Neurologic: alert oriented x 2          HISTORY:     Active Problems:    GI bleed (5/1/2022)      Past Medical History:   Diagnosis Date    Diabetes ([facility] [address]     no meds    Hypercholesteremia     Personal history of malignant neoplasm of prostate     Prostate cancer ([facility] [address]     Unspecified cerebral artery occlusion with cerebral infarction 2012    \"TIA\"      Past Surgical History:   Procedure Laterality Date    COLONOSCOPY N/A 6/22/2017    COLONOSCOPY performed by Tequila Mendiola MD at [facility]COPY    HX COLONOSCOPY      HX CRYOABLATION OF THE PROSTATE        Family History   Family history unknown: Yes     History reviewed, no pertinent family history.   Social History     Tobacco Use    Smoking status: Former Smoker    Smokeless tobacco: Never Used   Substance Use Topics    Alcohol use: No     No Known Allergies   Current Facility-Administered Medications   Medication Dose Route Frequency    sodium chloride (NS) flush 5-40 mL  5-40 mL IntraVENous Q8H    sodium chloride (NS) flush 5-40 mL  5-40 mL IntraVENous PRN    acetaminophen (TYLENOL) tablet 650 mg  650 mg Oral Q6H PRN    Or    acetaminophen (TYLENOL) suppository 650 mg  650 mg Rectal Q6H PRN    polyethylene glycol (MIRALAX) packet 17 g  17 g Oral DAILY PRN    ondansetron (ZOFRAN ODT) tablet 4 mg  4 mg Oral Q8H PRN    Or    ondansetron (ZOFRAN) injection 4 mg  4 mg IntraVENous Q6H PRN    atorvastatin (LIPITOR) tablet 40 mg  40 mg Oral DAILY    insulin lispro (HUMALOG) injection   SubCUTAneous Q6H    glucose chewable tablet 16 g  16 g Oral PRN    glucagon (GLUCAGEN) injection 1 mg  1 mg IntraMUSCular PRN    dextrose 10% infusion 0-250 mL  0-250 mL IntraVENous PRN    pantoprazole (PROTONIX) injection 40 mg  40 mg IntraVENous Q12H    cycloSPORINE (RESTASIS) 0.05 % ophthalmic emulsion 1 Drop  1 Drop Both Eyes BID    latanoprost (XALATAN) 0.005 % ophthalmic solution 1 Drop  1 Drop Both Eyes DAILY        LAB AND IMAGING FINDINGS:     Lab Results   Component Value Date/Time    WBC 3.7 (L) 05/02/2022 04:55 AM    HGB 7.6 (L) 05/02/2022 04:55 AM    PLATELET 272 41/61/9109 04:55 AM     Lab Results   Component Value Date/Time    Sodium 141 05/02/2022 04:55 AM    Potassium 4.2 05/02/2022 04:55 AM    Chloride 109 05/02/2022 04:55 AM    CO2 30 05/02/2022 04:55 AM    BUN 18 05/02/2022 04:55 AM    Creatinine 1.38 (H) 05/02/2022 04:55 AM    Calcium 9.5 05/02/2022 04:55 AM    Magnesium 2.2 05/01/2022 07:38 AM    Phosphorus 3.4 09/09/2021 07:18 AM      Lab Results   Component Value Date/Time    Alk.  phosphatase 55 05/01/2022 07:38 AM    Protein, total 7.1 05/01/2022 07:38 AM    Albumin 2.5 (L) 05/01/2022 07:38 AM    Globulin 4.6 (H) 05/01/2022 07:38 AM     Lab Results   Component Value Date/Time    INR 1.2 05/01/2022 07:38 AM    Prothrombin time 15.2 05/01/2022 07:38 AM    aPTT 29.8 05/01/2022 07:38 AM      Lab Results   Component Value Date/Time    Iron 80 09/09/2021 07:18 AM    TIBC 263 09/09/2021 07:18 AM    Iron % saturation 30 09/09/2021 07:18 AM    Ferritin 200 12/02/2020 07:03 AM      No results found for: PH, PCO2, PO2  No components found for: GLPOC   No results found for: CPK, CKMB           Total time: 30 minutes   Counseling / coordination time, spent as noted above:   > 50% counseling / coordination: yes with wife     Prolonged service was provided for  []30 min   []75 min in face to face time in the presence of the patient, spent as noted above.   Time Start:   Time End:

## 2022-05-02 NOTE — PROGRESS NOTES
Reason for Admission:  GI bleed [K92.2]                 RUR Score:   15%           Plan for utilizing home health:    No, LTC [facility]                      Likelihood of Readmission:   Moderate                         Do you (patient/family) have any concerns for transition/discharge?  no    Transition of Care Plan:       Initial assessment completed with spouse/SO. Cognitive status of patient: only aware of  person. Face sheet information confirmed:  yes. The patient designates spouse Say Rios to participate in his discharge plan and to receive any needed information. This patient lives at [address] [place] [address]. Patient is not able to navigate steps as needed. Prior to hospitalization, patient was considered to be independent with ADLs/IADLS : no . If not independent,  patient needs assist with : dressing, bathing, food preparation, cooking and toileting    Patient has a current ACP document on file: no      Healthcare Decision Maker: The patient will need BLS transport tto LTC facility. The patient already has DME as needed per LTC., medical equipment available in the home. Patient is not currently active with home health. Patient has stayed in a skilled nursing facility or rehab. Was  stay within last 60 days : yes. This patient is on dialysis :no    Currently, the discharge plan is LTC. The patient states that he can obtain his medications from the pharmacy, and take his medications as directed. Patient's current insurance is [employer] Medicare Part A and B and  for Life      Care Management Interventions  PCP Verified by CM:  Yes (now LTC at [facility])  Mode of Transport at Discharge: BLS  Transition of Care Consult (CM Consult): Long Term Care  Physical Therapy Consult: Yes  Occupational Therapy Consult: Yes  Support Systems: Spouse/Significant Jesus  Confirm Follow Up Transport: Other (see comment) (LTC resident)  Discharge Location  Patient Expects to be Discharged to[de-identified] [address] will continue to monitor and assist with transition of care needs.     SMITA Dela CruzN, RN  Care Management  Pager # [phone]

## 2022-05-02 NOTE — PROGRESS NOTES
Problem: Dysphagia (Adult)  Goal: *Acute Goals and Plan of Care (Insert Text)  Description:     Patient will:  1. Tolerate PO trials with 0 s/s overt distress in 4/5 trials  2. Utilize compensatory swallow strategies/maneuvers (decrease bite/sip, size/rate, alt. liq/sol) with min cues in 4/5 trials  3. Perform oral-motor/laryngeal exercises to increase oropharyngeal swallow function with min cues  4. Complete an objective swallow study (i.e., MBSS) to assess swallow integrity, r/o aspiration, and determine of safest LRD, min A    Recommend:   Soft and bite sized diet with thin liquids   Meds float in puree  Aspiration precautions  HOB >45 degrees during all intake and for at least 30 min after intake  Small bites/sips, Feed slowly, alternating bites/sips   Oral care three times daily     Outcome: Progressing Towards Goal   SPEECH LANGUAGE PATHOLOGY DYSPHAGIA TREATMENT    Patient: Aylin Kwong (80 y.o. male)  Date: 5/2/2022  Diagnosis: GI bleed [K92.2] <principal problem not specified>       Precautions: aspiration    PLOF: As per H&P      ASSESSMENT:  Pt was seen at bedside for follow up dysphagia management. Pt tolerated reg solids and thin liquids without any overt s/sx of aspiration. Labored bolus manipulation of solids observed with lingual spread post swallow; cleared with liquid wash. Laryngeal elevation was functional to palpation. Pt safe for soft/bite sized with thin liquids when cleared by MD, aspiration precautions, oral care TID, and meds as tolerated. ST will continue to follow for further dysphagia management. Progression toward goals:  []         Improving appropriately and progressing toward goals  [x]         Improving slowly and progressing toward goals  []         Not making progress toward goals and plan of care will be adjusted     PLAN:  Recommendations and Planned Interventions: See above  Patient continues to benefit from skilled intervention to address the above impairments. Continue treatment per established plan of care. Discharge Recommendations:  [place] and To Be Determined     SUBJECTIVE:   Patient stated Tonja Kowalski. OBJECTIVE:   Cognitive and Communication Status:  Neurologic State: Confused  Orientation Level: Oriented to person  Cognition: Follows commands  Perception: Appears intact  Perseveration: No perseveration noted  Safety/Judgement: Awareness of environment  Dysphagia Treatment:  Oral Assessment:  Oral Assessment  Labial: Decreased rate,Decreased seal  Dentition: Limited,Poor  Oral Hygiene: poor  Lingual: Decreased rate,Decreased strength  Velum: Unable to visualize  Mandible: No impairment  P.O. Trials:   Patient Position: 45 at [facility]   Vocal quality prior to P.O.: Breathy,Low volume   Consistency Presented: Solid,Thin liquid   How Presented: Self-fed/presented,Straw,Successive swallows,SLP-fed/presented   Bolus Acceptance: Impaired   Bolus Formation/Control: Impaired   Type of Impairment: Mastication   Propulsion: Delayed (# of seconds)   Oral Residue: Less than 10% of bolus,Lingual   Initiation of Swallow: No impairment   Laryngeal Elevation: Decreased,Weak   Aspiration Signs/Symptoms: None   Pharyngeal Phase Characteristics: No impairment, issues, or problems    Effective Modifications: Alternate liquids/solids,Small sips and bites   Cues for Modifications:  Moderate       Oral Phase Severity: Mild   Pharyngeal Phase Severity : No impairment    PAIN:  Start of Tx: appeared to be in no pain/discomfort  End of Tx: appeared to be in no pain/discomfort     After treatment:   []              Patient left in no apparent distress sitting up in chair  [x]              Patient left in no apparent distress in bed  [x]              Call bell left within reach  [x]              Nursing notified  []              Family present  []              Caregiver present  []              Bed alarm activated    COMMUNICATION/EDUCATION:   [x] Aspiration precautions; swallow safety; compensatory techniques  [x]        Patient/family able to participate in training and education   []  Patient unable to participate in training and education, education ongoing with staff   [] Patient understands goals and intent of therapy; neutral about participation     Thank you for this referral.    Janes Foss M.S. CCC-SLP/L  Speech-Language Pathologist

## 2022-05-02 NOTE — PROGRESS NOTES
conducted an initial consultation and Spiritual Assessment for Shani Bar, who is a 80 y. o.,male. Patients Primary Language is: [place]. According to the patients EMR Pentecostal Affiliation is: [facility].     The reason the Patient came to the hospital is:   Patient Active Problem List    Diagnosis Date Noted    GI bleed 41/55/4788    Diastolic dysfunction 24/13/2003    Keratoconjunctivitis sicca, not specified as Sjogren's, bilateral 11/19/2021    Left ventricular hypertrophy 11/19/2021    Primary open-angle glaucoma, bilateral, moderate stage 11/19/2021    Tricuspid regurgitation 11/19/2021    Weakness 11/19/2021    Chronic kidney disease due to benign hypertension 01/21/2020    Stage 3 chronic kidney disease ([facility] [address] 01/21/2020    Prostate cancer ([facility] [address] 09/24/2012    Unspecified cerebral artery occlusion with cerebral infarction 09/24/2012    Hypercholesteremia 09/24/2012    Personal history of malignant neoplasm of prostate 03/12/2009        The  provided the following Interventions:  Initiated a relationship of care and support. Chart reviewed. Assessment:  Patient does not have any Anabaptism/cultural needs that will affect patients preferences in health care. Plan:  Chaplains will continue to follow and will provide pastoral care on an as needed/requested basis.  recommends bedside caregivers page  on duty if patient shows signs of acute spiritual or emotional distress.     [address]  [phone])

## 2022-05-02 NOTE — PROGRESS NOTES
Problem: Falls - Risk of  Goal: *Absence of Falls  Description: Document Shakir Pratt Fall Risk and appropriate interventions in the flowsheet. Outcome: Progressing Towards Goal  Note: Fall Risk Interventions:  Mobility Interventions: Bed/chair exit alarm    Mentation Interventions: Adequate sleep, hydration, pain control,Bed/chair exit alarm    Medication Interventions: Bed/chair exit alarm    Elimination Interventions: Bed/chair exit alarm,Call light in reach    History of Falls Interventions: Bed/chair exit alarm         Problem: Pressure Injury - Risk of  Goal: *Prevention of pressure injury  Description: Document Rio Scale and appropriate interventions in the flowsheet.   Outcome: Progressing Towards Goal  Note: Pressure Injury Interventions:  Sensory Interventions: Assess changes in LOC         Activity Interventions: Pressure redistribution bed/mattress(bed type)    Mobility Interventions: Assess need for specialty bed    Nutrition Interventions: Document food/fluid/supplement intake

## 2022-05-02 NOTE — ROUTINE PROCESS
8573 - Bedside and Verbal shift change report given to [address] [place] [address] (oncoming nurse) by Nelida Guadalupe RN (offgoing nurse). Report included the following information SBAR, Kardex, Intake/Output, MAR and Recent Results. Patient sleeping in bed, bed in lowest position, bed alarm set, call bell in reach.

## 2022-05-02 NOTE — PROGRESS NOTES
OT orders received and medical chart review completed. Pt presents at baseline as PLOF e.g. dep with ADLs and bed bound status and resides at [facility]. Formal OT evaluation not indicated. OT to sign off.

## 2022-05-02 NOTE — ACP (ADVANCE CARE PLANNING)
[address] [phone]    Jose Jay, JLUIS and this SW met with pt at bedside to assess. Upon entry, pt laying in bed with head elevated. Pt was asleep. Pt alerted to loud verbal stimuli. Pt able to state he is in the hospital. Pt does not know why he was brought in. Pt acknowledged he is , by stating yes. PT attempted to say other things, but speech was garbled and not understandable. Pt asked if he'd like to eat. He reports yes. NP assisted him with eating some of his lunch. Pt able to follow simple commands, could suck from a straw, took extended period of time to chew food. Pt's care team on floor, notified, he needed help to continue his lunch. LMSW contacted pt's wife, Deon Fernandez, at [phone] to discuss goals of care. Pt's wife reports she wants to be DNR/DNI, and wants him to pass peacefully, in event of cardiopulmonary arrest.  SW informed her, we would like to complete POST to protect pt when he leaves the hospital.  She agreed to come to hospital tomorrow a.m. after medical appointment at 0900hr.  Pt's wife was asked to notify nurse to call Palliative Care, upon arrival.  She verbalized understanding and willingness to do so. Palliative Care will notify unit of this request in the a.m. Thank you for this referral to Palliative Care. The palliative care team remains available to provide support for pt and his family. POST will be completed prior [facility], where he resides.      CODE STATUS:  DNR/DNI    Advanced Steps Advance Care Planning Conversation  [facility] (Physician Orders for Scope of Treatment)       Date of conversation:  05- Location:  [facility]   Length (minutes): 20    Participants:   [x] Other Surrogate Decision Maker / Next of Kin    Name: Deon Fernandez    Relationship to Patient: spouse     Phone Number: [phone]     Advanced Steps® ACP Facilitator: Ankita Mercado LMSW      Conversation Topics   (If Patient does not have decision making capacity, Agent/Surrogate responds based on understanding of how patient would respond if capable)    Understanding of Medical Condition/s AND Potential Complications:    Patient response: Pt unable to explain, due to dementia. Healthcare Agent/Other Surrogate: Pt's wife verbalized understand  [place] from Experiences Related to Serious Illness:   Identifies the following as important for living well:***    Hopes:***      Worries/Fears about Medical Condition:***    Sources of support/comfort described as:***    Cultural, Alevism, spiritual, or personal beliefs described as:***    Needs to discuss with spiritual/Alevism advisor: [] Yes  [] No    Needs more information about illness and complications:  [] Yes  [] No      Cardiopulmonary Resuscitation      \"What do you understand about CPR? \" Response:***    Order Elected for CPR:  []  Attempt Resuscitation []  Do Not Attempt Resuscitation      When NOT in Cardiopulmonary Arrest, Order Elected:      [] Comfort Measures  [] Limited Additional Interventions  [] Full Interventions    Artificially Administered Nutrition, Order Elected:    [] No Feeding Tube   [] Feeding Tube for a defined trial period  [] Feeding Tube long-term if indicated    The following was provided (check all that apply):      Healthcare Decision Information Cards:   [] Help with Breathing Facts   [] Tube Feeding Facts   [] CPR Facts      [] Review of existing Advance Directive  [] Assistance with Completion of New Advance Directive   [] Review of [place] Form       Meeting Outcomes:   [] ACP discussion completed   [] [place] form completed  [] [place] prepared for Provider review and signature   [] Original placed on Chart, if in facility (form to be sent with patient at discharge)  [] Copy given to healthcare agent    [] Copy scanned to electronic medical record    If ACP discussion not completed, last interview topic discussed: ***     Follow-up plan:     [] Schedule follow-up conversation to continue planning   [] Referred individual to Provider for additional questions/concerns   [] Advised patient/agent/surrogate to review completed POST form and update if needed with changes in condition, patient preferences or care setting     [] This note routed to one or more involved healthcare providers    Marcia Zuniga, [facility]   Azalea Barron [address]: [phone])

## 2022-05-02 NOTE — PROGRESS NOTES
Problem: Falls - Risk of  Goal: *Absence of Falls  Description: Document [place] Fall Risk and appropriate interventions in the flowsheet. Outcome: Progressing Towards Goal  Note: Fall Risk Interventions:  Mobility Interventions: Bed/chair exit alarm,Communicate number of staff needed for ambulation/transfer    Mentation Interventions: Bed/chair exit alarm,Door open when patient unattended,Evaluate medications/consider consulting pharmacy    Medication Interventions: Bed/chair exit alarm,Evaluate medications/consider consulting pharmacy    Elimination Interventions: Bed/chair exit alarm,Call light in reach    History of Falls Interventions: Bed/chair exit alarm,Door open when patient unattended,Consult care management for discharge planning,Evaluate medications/consider consulting pharmacy         Problem: Patient Education: Go to Patient Education Activity  Goal: Patient/Family Education  Outcome: Progressing Towards Goal     Problem: Pain  Goal: *Control of Pain  Outcome: Progressing Towards Goal  Goal: *PALLIATIVE CARE:  Alleviation of Pain  Outcome: Progressing Towards Goal     Problem: Patient Education: Go to Patient Education Activity  Goal: Patient/Family Education  Outcome: Progressing Towards Goal     Problem: Pressure Injury - Risk of  Goal: *Prevention of pressure injury  Description: Document Rio Scale and appropriate interventions in the flowsheet.   Outcome: Progressing Towards Goal  Note: Pressure Injury Interventions:  Sensory Interventions: Assess changes in LOC,Avoid rigorous massage over bony prominences,Check visual cues for pain,Discuss PT/OT consult with provider,Keep linens dry and wrinkle-free,Maintain/enhance activity level,Minimize linen layers         Activity Interventions: Pressure redistribution bed/mattress(bed type)    Mobility Interventions: HOB 30 degrees or less,Pressure redistribution bed/mattress (bed type)    Nutrition Interventions: Document food/fluid/supplement intake                     Problem: Patient Education: Go to Patient Education Activity  Goal: Patient/Family Education  Outcome: Progressing Towards Goal     Problem: Patient Education: Go to Patient Education Activity  Goal: Patient/Family Education  Outcome: Progressing Towards Goal     Problem: Upper and Lower GI Bleed: Day 1  Goal: Off Pathway (Use only if patient is Off Pathway)  Outcome: Progressing Towards Goal  Goal: Activity/Safety  Outcome: Progressing Towards Goal  Goal: Consults, if ordered  Outcome: Progressing Towards Goal  Goal: Diagnostic Test/Procedures  Outcome: Progressing Towards Goal  Goal: Nutrition/Diet  Outcome: Progressing Towards Goal  Goal: Discharge Planning  Outcome: Progressing Towards Goal  Goal: Medications  Outcome: Progressing Towards Goal  Goal: Respiratory  Outcome: Progressing Towards Goal  Goal: Treatments/Interventions/Procedures  Outcome: Progressing Towards Goal  Goal: Psychosocial  Outcome: Progressing Towards Goal  Goal: *Optimal pain control at patient's stated goal  Outcome: Progressing Towards Goal  Goal: *Hemodynamically stable  Outcome: Progressing Towards Goal  Goal: *Demonstrates progressive activity  Outcome: Progressing Towards Goal     Problem: Upper and Lower GI Bleed: Day 2  Goal: Off Pathway (Use only if patient is Off Pathway)  Outcome: Progressing Towards Goal  Goal: Activity/Safety  Outcome: Progressing Towards Goal  Goal: Consults, if ordered  Outcome: Progressing Towards Goal  Goal: Diagnostic Test/Procedures  Outcome: Progressing Towards Goal  Goal: Nutrition/Diet  Outcome: Progressing Towards Goal  Goal: Discharge Planning  Outcome: Progressing Towards Goal  Goal: Medications  Outcome: Progressing Towards Goal  Goal: Respiratory  Outcome: Progressing Towards Goal  Goal: Treatments/Interventions/Procedures  Outcome: Progressing Towards Goal  Goal: Psychosocial  Outcome: Progressing Towards Goal  Goal: *Optimal pain control at patient's stated goal  Outcome: Progressing Towards Goal  Goal: *Hemodynamically stable  Outcome: Progressing Towards Goal  Goal: *Tolerating diet  Outcome: Progressing Towards Goal  Goal: *Demonstrates progressive activity  Outcome: Progressing Towards Goal     Problem: Upper and Lower GI Bleed: Day 3  Goal: Off Pathway (Use only if patient is Off Pathway)  Outcome: Progressing Towards Goal  Goal: Activity/Safety  Outcome: Progressing Towards Goal  Goal: Diagnostic Test/Procedures  Outcome: Progressing Towards Goal  Goal: Nutrition/Diet  Outcome: Progressing Towards Goal  Goal: Discharge Planning  Outcome: Progressing Towards Goal  Goal: Medications  Outcome: Progressing Towards Goal  Goal: Treatments/Interventions/Procedures  Outcome: Progressing Towards Goal  Goal: Psychosocial  Outcome: Progressing Towards Goal     Problem: Upper and Lower GI Bleed:  Discharge Outcomes  Goal: *Hemodynamically stable  Outcome: Progressing Towards Goal  Goal: *Lungs clear or at baseline  Outcome: Progressing Towards Goal  Goal: *Demonstrates independent activity or return to baseline  Outcome: Progressing Towards Goal  Goal: *Pain is controlled to three or less  Outcome: Progressing Towards Goal  Goal: *Verbalizes understanding and describes prescribed diet  Outcome: Progressing Towards Goal  Goal: *Tolerating diet  Outcome: Progressing Towards Goal  Goal: *Verbalizes name, dosage, time, side effects, and number of days to continue medications  Outcome: Progressing Towards Goal  Goal: *Anxiety reduced or absent  Outcome: Progressing Towards Goal  Goal: *Understands and describes signs and symptoms to report to providers(Stroke Metric)  Outcome: Progressing Towards Goal  Goal: *Describes follow-up/return visits to physicians  Outcome: Progressing Towards Goal  Goal: *Describes available resources and support systems  Outcome: Progressing Towards Goal     Problem: Patient Education: Go to Patient Education Activity  Goal: Patient/Family Education  Outcome: Progressing Towards Goal

## 2022-05-02 NOTE — PROGRESS NOTES
[email or web address]  [phone]    Gastroenterology follow up-Progress note    Impression:  1. Maroon colored stool in ED - 1 episode of painless hematochezia 4/30, no further bleeding. 2. Anemia - FOB positive, hgb 7.6 today. Platelets/INR nml. CTA 5/1 unrevealing. May have been diverticular that has thus resolved. 6/2017 colonoscopy significant for mild diverticulosis of the cecum. 3. Prostate cancer    4. ? Dementia   5. CVA 2012  6. DM  7. Small AAA    Plan:  1. No plans for GI procedures at this time given stable H/H, no recurrent bleeding, and significant age/comorbidities. 2. Consider NM bleeding scan if bleeding reoccurs with possible IR consult if positive. 3. IV PPI BID. 4. Monitor H/H, transfuse if Hgb <7.  5. May benefit from fleet enema given concerns for rectal stool burden CTA. 10. [id number] Giselle Campuzano for diet from GI perspective.   7. Continue medical management per primary team.    Chief Complaint: Anemia, hematochezia      Subjective:  Confused but denies abdominal pain        Eyes: conjunctiva normal, EOM normal   Neck: ROM normal, supple and trachea normal   Cardiovascular: heart normal, intact distal pulses, normal rate and regular rhythm   Pulmonary/Chest Wall: breath sounds normal and effort normal   Abdominal: appearance normal, bowel sounds normal and soft, non-acute, non-tender     Patient Active Problem List   Diagnosis Code    Prostate cancer ([facility] [address] C61    Unspecified cerebral artery occlusion with cerebral infarction I63.50    Hypercholesteremia E78.00    Personal history of malignant neoplasm of prostate Z85.46    Chronic kidney disease due to benign hypertension I12.9    Stage 3 chronic kidney disease (HCC) B10.26    Diastolic dysfunction V00.89    Keratoconjunctivitis sicca, not specified as Sjogren's, bilateral B73.970    Left ventricular hypertrophy I51.7    Primary open-angle glaucoma, bilateral, moderate stage H40.1132    Tricuspid regurgitation I07.1    Weakness R53.1    GI bleed K92.2         Visit Vitals  BP (!) 109/56   Pulse 77   Temp 98.8 °F (37.1 °C)   Resp 15   Wt 63 kg (139 lb)   SpO2 100%   BMI 20.53 kg/m²           Intake/Output Summary (Last 24 hours) at 5/2/2022 1120  Last data filed at 5/2/2022 0549  Gross per 24 hour   Intake 200 ml   Output 500 ml   Net -300 ml       CBC w/Diff    Lab Results   Component Value Date/Time    WBC 3.7 (L) 05/02/2022 04:55 AM    RBC 2.85 (L) 05/02/2022 04:55 AM    HGB 7.6 (L) 05/02/2022 04:55 AM    HCT 25.3 (L) 05/02/2022 04:55 AM    MCV 88.8 05/02/2022 04:55 AM    MCH 26.7 05/02/2022 04:55 AM    MCHC 30.0 (L) 05/02/2022 04:55 AM    RDW 18.4 (H) 05/02/2022 04:55 AM     05/02/2022 04:55 AM    Lab Results   Component Value Date/Time    GRANS 47 05/02/2022 04:55 AM    LYMPH 44 05/02/2022 04:55 AM    EOS 2 05/02/2022 04:55 AM    BASOS 0 05/02/2022 04:55 AM      Basic Metabolic Profile   Recent Labs     05/02/22  0455 05/01/22  0738 05/01/22  0738      < > 141   K 4.2   < > 5.2      < > 109   CO2 30   < > 31   BUN 18   < > 18   CA 9.5   < > 9.8   MG  --   --  2.2    < > = values in this interval not displayed. Hepatic Function    Lab Results   Component Value Date/Time    ALB 2.5 (L) 05/01/2022 07:38 AM    TP 7.1 05/01/2022 07:38 AM    AP 55 05/01/2022 07:38 AM    No results found for: TBIL       Coags   Recent Labs     05/01/22  0738   PTP 15.2   INR 1.2   APTT 29.8               ARIANA Gonzalez    [facility]. [facility]  Phone: [phone]  Pager: [phone]

## 2022-05-02 NOTE — PROGRESS NOTES
[facility] Hospitalist Group  Progress Note    Patient: Serene Bloch. Age: 80 y.o. : 3/26/1927 MR#: [id number] SSN: [id number]  Date/Time: 2022     Subjective: Patient alert awake, oriented x2, confused but pleasant. Feels fine, denies any new complaints. Per RN, no bleeding episodes     Assessment/Plan:     1. GI bleed. 2. Acute blood loss anemia  3. Prostate cancer with chronic Mahmood  4. Dementia  5. Diabetes type 2     Plan:  · GI input noted, recommend conservative treatment. · Will advance diet as tolerated, SLP sade noted  · Will monitor H&H, H&H stable posttransfusion  · Continue sliding scale insulin  · Continuing atorvastatin      DVT/GI Prophylaxis: SCD's    Disposition: Back to long-term care once stable    Discussed with patient's wife over the phone and explained in detail about my above plan care. Discussed with her about advanced directives, she is very clear she wants patient to be DNR/DNI. Per wife, patient mostly in the bed does not ambulate. Palliative care will be consulted        Case discussed with:  [x]Patient  [x]Family  [x]Nursing  []Case Management  DVT Prophylaxis:  []Lovenox  []Hep SQ  [x]SCDs  []Coumadin   []Eliquis/Xarelto     Objective:   VS:   Visit Vitals  BP (!) 109/56   Pulse 77   Temp 98.8 °F (37.1 °C)   Resp 15   Wt 63 kg (139 lb)   SpO2 100%   BMI 20.53 kg/m²      Tmax/24hrs: Temp (24hrs), Av.5 °F (36.9 °C), Min:98 °F (36.7 °C), Max:99.2 °F (37.3 °C)  IOBRIEF    Intake/Output Summary (Last 24 hours) at 2022 1108  Last data filed at 2022 0549  Gross per 24 hour   Intake 200 ml   Output 500 ml   Net -300 ml       General:  Alert, cooperative, no acute distress    HEENT: PERRLA, anicteric sclerae. Pulmonary:  CTA Bilaterally. No Wheezing/Rales. Cardiovascular: Regular rate and Rhythm. GI:  Soft, Non distended, Non tender. + Bowel sounds. Extremities:  No edema. No calf tenderness.    Psych: Not anxious or agitated. Neurologic: Alert and oriented X 2. Moves all ext.   Additional: Mahmood with clear urine    Medications:   Current Facility-Administered Medications   Medication Dose Route Frequency    sodium chloride (NS) flush 5-40 mL  5-40 mL IntraVENous Q8H    sodium chloride (NS) flush 5-40 mL  5-40 mL IntraVENous PRN    acetaminophen (TYLENOL) tablet 650 mg  650 mg Oral Q6H PRN    Or    acetaminophen (TYLENOL) suppository 650 mg  650 mg Rectal Q6H PRN    polyethylene glycol (MIRALAX) packet 17 g  17 g Oral DAILY PRN    ondansetron (ZOFRAN ODT) tablet 4 mg  4 mg Oral Q8H PRN    Or    ondansetron (ZOFRAN) injection 4 mg  4 mg IntraVENous Q6H PRN    atorvastatin (LIPITOR) tablet 40 mg  40 mg Oral DAILY    insulin lispro (HUMALOG) injection   SubCUTAneous Q6H    glucose chewable tablet 16 g  16 g Oral PRN    glucagon (GLUCAGEN) injection 1 mg  1 mg IntraMUSCular PRN    dextrose 10% infusion 0-250 mL  0-250 mL IntraVENous PRN    pantoprazole (PROTONIX) injection 40 mg  40 mg IntraVENous Q12H    cycloSPORINE (RESTASIS) 0.05 % ophthalmic emulsion 1 Drop  1 Drop Both Eyes BID    latanoprost (XALATAN) 0.005 % ophthalmic solution 1 Drop  1 Drop Both Eyes DAILY       Labs:    Recent Results (from the past 24 hour(s))   GLUCOSE, POC    Collection Time: 05/01/22 12:12 PM   Result Value Ref Range    Glucose (POC) 114 (H) 70 - 110 mg/dL   URINALYSIS W/MICROSCOPIC    Collection Time: 05/01/22  1:30 PM   Result Value Ref Range    Color YELLOW      Appearance CLEAR      Specific gravity >1.030 (H) 1.005 - 1.030    pH (UA) 6.0 5.0 - 8.0      Protein Negative NEG mg/dL    Glucose Negative NEG mg/dL    Ketone Negative NEG mg/dL    Bilirubin Negative NEG      Blood Negative NEG      Urobilinogen 1.0 0.2 - 1.0 EU/dL    Nitrites Positive (A) NEG      Leukocyte Esterase TRACE (A) NEG      WBC 0 to 3 0 - 4 /hpf    RBC Negative 0 - 5 /hpf    Epithelial cells Negative 0 - 5 /lpf    Bacteria 1+ (A) NEG /hpf   HGB & HCT Collection Time: 05/01/22  1:50 PM   Result Value Ref Range    HGB 8.2 (L) 13.0 - 16.0 g/dL    HCT 27.0 (L) 36.0 - 48.0 %   GLUCOSE, POC    Collection Time: 05/01/22  5:57 PM   Result Value Ref Range    Glucose (POC) 144 (H) 70 - 110 mg/dL   HGB & HCT    Collection Time: 05/01/22  9:20 PM   Result Value Ref Range    HGB 7.8 (L) 13.0 - 16.0 g/dL    HCT 25.2 (L) 36.0 - 48.0 %   GLUCOSE, POC    Collection Time: 05/01/22 11:44 PM   Result Value Ref Range    Glucose (POC) 149 (H) 70 - 930 mg/dL   METABOLIC PANEL, BASIC    Collection Time: 05/02/22  4:55 AM   Result Value Ref Range    Sodium 141 136 - 145 mmol/L    Potassium 4.2 3.5 - 5.5 mmol/L    Chloride 109 100 - 111 mmol/L    CO2 30 21 - 32 mmol/L    Anion gap 2 (L) 3.0 - 18 mmol/L    Glucose 102 (H) 74 - 99 mg/dL    BUN 18 7.0 - 18 MG/DL    Creatinine 1.38 (H) 0.6 - 1.3 MG/DL    BUN/Creatinine ratio 13 12 - 20      GFR est AA 58 (L) >60 ml/min/1.73m2    GFR est non-AA 48 (L) >60 ml/min/1.73m2    Calcium 9.5 8.5 - 10.1 MG/DL   CBC WITH AUTOMATED DIFF    Collection Time: 05/02/22  4:55 AM   Result Value Ref Range    WBC 3.7 (L) 4.6 - 13.2 K/uL    RBC 2.85 (L) 4.35 - 5.65 M/uL    HGB 7.6 (L) 13.0 - 16.0 g/dL    HCT 25.3 (L) 36.0 - 48.0 %    MCV 88.8 78.0 - 100.0 FL    MCH 26.7 24.0 - 34.0 PG    MCHC 30.0 (L) 31.0 - 37.0 g/dL    RDW 18.4 (H) 11.6 - 14.5 %    PLATELET 554 937 - 250 K/uL    MPV 10.4 9.2 - 11.8 FL    NRBC 0.0 0  WBC    ABSOLUTE NRBC 0.00 0.00 - 0.01 K/uL    NEUTROPHILS 47 40 - 73 %    LYMPHOCYTES 44 21 - 52 %    MONOCYTES 6 3 - 10 %    EOSINOPHILS 2 0 - 5 %    BASOPHILS 0 0 - 2 %    IMMATURE GRANULOCYTES 1 (H) 0.0 - 0.5 %    ABS. NEUTROPHILS 1.7 (L) 1.8 - 8.0 K/UL    ABS. LYMPHOCYTES 1.6 0.9 - 3.6 K/UL    ABS. MONOCYTES 0.2 0.05 - 1.2 K/UL    ABS. EOSINOPHILS 0.1 0.0 - 0.4 K/UL    ABS. BASOPHILS 0.0 0.0 - 0.1 K/UL    ABS. IMM.  GRANS. 0.0 0.00 - 0.04 K/UL    DF AUTOMATED     GLUCOSE, POC    Collection Time: 05/02/22  5:33 AM   Result Value Ref Range Glucose (POC) 116 (H) 70 - 110 mg/dL       Signed By: Estelle Blandon MD     May 2, 2022      Disclaimer: Sections of this note are dictated using utilizing voice recognition software. Minor typographical errors may be present. If questions arise, please do not hesitate to contact me or call our department.

## 2022-05-02 NOTE — ROUTINE PROCESS
Bedside shift change report given to Sharon Blunt (oncoming nurse) by Néstor Mtz RN (offgoing nurse). Report included the following information SBAR, Kardex, Intake/Output, MAR and Recent Results.

## 2022-05-02 NOTE — PROGRESS NOTES
Physician Progress Note      PATIENT:               Chrystine Gosselin  CSN #:                  [id number]  :                       3/26/1927  ADMIT DATE:       2022 11:49 PM  DISCH DATE:  RESPONDING  PROVIDER #:        Duane Porter MD          QUERY TEXT:    Pt admitted with GIB and has anemia documented. If possible, please document in progress notes and discharge summary further specificity regarding the acuity and type of anemia:      The medical record reflects the following:  Risk Factors: 81 yo male with PMH  prostate cancer unknown staging    Clinical Indicators: Per H&P presented to [facility] ER with rectal bleeding    H/H 6.9/23.1 from previous 9..9    Per GI consult LGI bleed, most likely diverticular, Anemia - FOB positive    Treatment: Transfuse unit PRBC's, serial labs, GI consult        Eliot AGUDELO, RN, [address]  C: [phone]    [email or web address]  Options provided:  -- Anemia due to acute blood loss  -- Anemia due to acute on chronic blood loss  -- Anemia due to prostate cancer  -- Other - I will add my own diagnosis  -- Disagree - Not applicable / Not valid  -- Disagree - Clinically unable to determine / Unknown  -- Refer to Clinical Documentation Reviewer    PROVIDER RESPONSE TEXT:    This patient has acute blood loss anemia.     Query created by: Mario Alberto Vilchis on 2022 4:13 PM      Electronically signed by:  Duane Porter MD 2022 4:28 PM

## 2022-05-02 NOTE — PROGRESS NOTES
Problem: Mobility Impaired (Adult and Pediatric)  Goal: *Acute Goals and Plan of Care (Insert Text)  Outcome: Resolved/Met   PHYSICAL THERAPY EVALUATION AND DISCHARGE    Patient: Meenakshi Mcneill (80 y.o. male)  Date: 5/2/2022  Primary Diagnosis: GI bleed [K92.2]        Precautions: standard        PLOF: Pt reports he stays in bed mostly, assisted for bed mobility and all ADLs    ASSESSMENT :  Based on the objective data described below, the patient presents with baseline functional mobility. Pt with AROM of LEs WNL  at bed level. Supine to sit with modA. Needing constant support in sitting. Pt reporting he does not stand and declining to attempt, returning to supine in bed with modA. Pt positioned for comfort, left with all needs met and call bell in reach. Bed alarm activated. Patient does not require further skilled intervention at this level of care. PLAN :  Recommendations and Planned Interventions:   No formal PT needs identified at this time. Discharge Recommendations: Return to LTC   Further Equipment Recommendations for Discharge: N/A     SUBJECTIVE:   Patient stated I haven't done much.     OBJECTIVE DATA SUMMARY:     Past Medical History:   Diagnosis Date    Diabetes ([facility] [address]     no meds    Hypercholesteremia     Personal history of malignant neoplasm of prostate     Prostate cancer ([facility] [address] [address]     Unspecified cerebral artery occlusion with cerebral infarction 2012    \"TIA\"     Past Surgical History:   Procedure Laterality Date    COLONOSCOPY N/A 6/22/2017    COLONOSCOPY performed by Enriqueta Garcia MD at [facility]    HX COLONOSCOPY      HX CRYOABLATION OF THE PROSTATE       Barriers to Learning/Limitations: None  Compensate with: N/A  Home Situation:   Home Situation  Home Environment: Long term care  One/Two Story Residence: One story  Living Alone: No  Support Systems: Spouse/Significant Other,Assisted Living  Patient Expects to be Discharged to[de-identified] Rehab hospital/unit acute  Current DME Used/Available at Home: Hospital bed  Critical Behavior:  Neurologic State: Confused  Orientation Level: Oriented to person  Cognition: Follows commands  Safety/Judgement: Awareness of environment  Psychosocial  Patient Behaviors: Cooperative  Purposeful Interaction: Yes  Pt Identified Daily Priority: Clinical issues (comment)  Caritas Process: Nurture loving kindness;Establish trust;Attend basic human needs; Supportive expression  Caring Interventions: Reassure; Therapeutic modalities  Reassure: Therapeutic listening; Informing;Caring rounds  Therapeutic Modalities: Humor; Intentional therapeutic touch    Strength:    Strength: Generally decreased, functional    Tone & Sensation:   Tone: Normal    Sensation: Intact    Range Of Motion:  AROM: Within functional limits  Posture:  Posture (WDL): Exceptions to WDL  Posture Assessment: Forward head;Trunk flexion   Functional Mobility:  Bed Mobility:  Rolling: Minimum assistance  Supine to Sit: Moderate assistance  Sit to Supine: Moderate assistance  Scooting: Moderate assistance  Balance:   Sitting: Impaired; With support  Sitting - Static: Fair (occasional)    Pain:  Pain level pre-treatment: 0/10   Pain level post-treatment: 0/10    Activity Tolerance:   Baseline tolerance     Please refer to the flowsheet for vital signs taken during this treatment. After treatment:   []         Patient left in no apparent distress sitting up in chair  [x]         Patient left in no apparent distress in bed  [x]         Call bell left within reach  [x]         Nursing notified  []         Caregiver present  [x]         Bed alarm activated  []         SCDs applied    COMMUNICATION/EDUCATION:   [x]         Role of Physical Therapy in the acute care setting. [x]         Fall prevention education was provided and the patient/caregiver indicated understanding. [x]         Patient/family have participated as able in goal setting and plan of care.   [x]         Patient/family agree to work toward stated goals and plan of care. []         Patient understands intent and goals of therapy, but is neutral about his/her participation. []         Patient is unable to participate in goal setting/plan of care: ongoing with therapy staff.  []         Other:     Thank you for this referral.  David Espino, PT   Time Calculation: 14 mins      Eval Complexity: History: MEDIUM  Complexity : 1-2 comorbidities / personal factors will impact the outcome/ POC Exam:LOW Complexity : 1-2 Standardized tests and measures addressing body structure, function, activity limitation and / or participation in recreation  Presentation: LOW Complexity : Stable, uncomplicated  Clinical Decision Making:Low Complexity low  Overall Complexity:LOW

## 2022-05-03 VITALS
SYSTOLIC BLOOD PRESSURE: 113 MMHG | TEMPERATURE: 98.1 F | BODY MASS INDEX: 20.53 KG/M2 | OXYGEN SATURATION: 97 % | DIASTOLIC BLOOD PRESSURE: 46 MMHG | WEIGHT: 139 LBS | HEART RATE: 74 BPM | RESPIRATION RATE: 12 BRPM

## 2022-05-03 LAB
ABO + RH BLD: NORMAL
BLD PROD TYP BPU: NORMAL
BLD PROD TYP BPU: NORMAL
BLOOD GROUP ANTIBODIES SERPL: NORMAL
BPU ID: NORMAL
BPU ID: NORMAL
CROSSMATCH RESULT,%XM: NORMAL
CROSSMATCH RESULT,%XM: NORMAL
GLUCOSE BLD STRIP.AUTO-MCNC: 156 MG/DL (ref 70–110)
GLUCOSE BLD STRIP.AUTO-MCNC: 99 MG/DL (ref 70–110)
HCT VFR BLD AUTO: 25.2 % (ref 36–48)
HGB BLD-MCNC: 7.6 G/DL (ref 13–16)
SPECIMEN EXP DATE BLD: NORMAL
STATUS OF UNIT,%ST: NORMAL
STATUS OF UNIT,%ST: NORMAL
UNIT DIVISION, %UDIV: 0
UNIT DIVISION, %UDIV: 0

## 2022-05-03 PROCEDURE — 85018 HEMOGLOBIN: CPT

## 2022-05-03 PROCEDURE — 74011250637 HC RX REV CODE- 250/637: Performed by: HOSPITALIST

## 2022-05-03 PROCEDURE — 99239 HOSP IP/OBS DSCHRG MGMT >30: CPT | Performed by: HOSPITALIST

## 2022-05-03 PROCEDURE — 82962 GLUCOSE BLOOD TEST: CPT

## 2022-05-03 PROCEDURE — 74011636637 HC RX REV CODE- 636/637: Performed by: STUDENT IN AN ORGANIZED HEALTH CARE EDUCATION/TRAINING PROGRAM

## 2022-05-03 PROCEDURE — 36415 COLL VENOUS BLD VENIPUNCTURE: CPT

## 2022-05-03 PROCEDURE — 74011250637 HC RX REV CODE- 250/637: Performed by: STUDENT IN AN ORGANIZED HEALTH CARE EDUCATION/TRAINING PROGRAM

## 2022-05-03 PROCEDURE — 94762 N-INVAS EAR/PLS OXIMTRY CONT: CPT

## 2022-05-03 PROCEDURE — 99232 SBSQ HOSP IP/OBS MODERATE 35: CPT | Performed by: NURSE PRACTITIONER

## 2022-05-03 RX ORDER — PANTOPRAZOLE SODIUM 40 MG/1
40 TABLET, DELAYED RELEASE ORAL DAILY
Qty: 30 TABLET | Refills: 0 | Status: SHIPPED
Start: 2022-05-03 | End: 2022-06-02

## 2022-05-03 RX ADMIN — INSULIN LISPRO 2 UNITS: 100 INJECTION, SOLUTION INTRAVENOUS; SUBCUTANEOUS at 12:45

## 2022-05-03 RX ADMIN — ATORVASTATIN CALCIUM 40 MG: 40 TABLET, FILM COATED ORAL at 09:07

## 2022-05-03 RX ADMIN — LATANOPROST 1 DROP: 50 SOLUTION OPHTHALMIC at 09:07

## 2022-05-03 RX ADMIN — PANTOPRAZOLE SODIUM 40 MG: 40 TABLET, DELAYED RELEASE ORAL at 09:07

## 2022-05-03 RX ADMIN — CYCLOSPORINE 1 DROP: 0.5 EMULSION OPHTHALMIC at 09:07

## 2022-05-03 NOTE — PROGRESS NOTES
Medical transport arranged through [address] [place] [address] at 1300. Notified Dr. Tang Llanos at [facility] and the patient's wife.  will continue to monitor and assist with transition of care needs.     Lyly Wilson, SMITAN, RN  Care Management  Pager # [phone]

## 2022-05-03 NOTE — ACP (ADVANCE CARE PLANNING)
[address] [facility] [phone]  [phone]    Kimber Montenegro, NP, Ricky Perry RN and this LMSW met with pt and his wife at bedside for follow up to address goals of care. Pt is already a DNR, but does not have a POST at this time. Pt's wife reports she wants a document that will protect him from undergoing resuscitation efforts at [facility], in event of Cardiopulmonary Arrest.  Upon entry, pt laying in bed with head elevated. Pt AAOx3. Pt's wife at bedside. Pt able to identify wife. Pt reports he's doing better. Pt's wife reports he seems to be doing well. NP and this LMSW addressed POST form and pt's wife reiterated, she doesn't want him to suffer any more than he already has, and feels attempting to resuscitate him, would cause more suffering. Issues with pt's swallowing were addressed, pt's wife reports he loves chocolate pudding. Pt agreed with this. Pt's wife agreed he does best with purreed food. She reports she would not want him to have a feeding tube if it ever becomes unsafe for him to eat by mouth. She would want to allow him to eat for comfort and pleasure, should this happen. POST form completed and signed. Thank you for this referral to Palliative Care. The palliative care team has addressed goals of care. Pt is anticipated to return to SNF today. There are no further palliative care needs at this time.      CODE STATUS:  DNR/DNI    [facility] [employer] [address] POST (Physician Orders for Scope of Treatment)       Date of conversation:  05/03/2022 Location:  [facility]   Length (minutes): 20    Participants:   [x] Patient   [x] Other Surrogate Decision Maker / Next of Kin    Name:  Aurea Turner    Relationship to Patient: Spouse     Phone Number: [phone]    [employer]® ACP Facilitator: Robin Silva LMSW      Conversation Topics   (If Patient does not have decision making capacity, Agent/Surrogate responds based on understanding of how patient would respond if capable)    Understanding of Medical Condition/s AND Potential Complications:    Patient response: Pt is not able to understand or verbalize understanding due to dementia. Healthcare Agent/Other Surrogate:  Pt's wife verbalized understanding of pt's condition and potential complications thereof. Lesson [place] from Experiences Related to Serious Illness: Pt would not want to live in a vegetative state. Identifies the following as important for living well: Being able to actively communicate with family during visits. Hopes: Pt will return to prior level of function. Worries/Fears about Medical Condition: Pt's condition will deteriorate and he will suffer. Sources of support/comfort described as: Wife, familiar setting. Cultural, Sabianist, spiritual, or personal beliefs described as: None stated. Needs to discuss with spiritual/Sabianist advisor: [] Yes  [x] No    Needs more information about illness and complications:  [] Yes  [x] No      Cardiopulmonary Resuscitation      \"What do you understand about CPR? \" Response: CPR would cause more suffering that good for patient.    Order Elected for CPR:  []  Attempt Resuscitation [x]  Do Not Attempt Resuscitation      When NOT in Cardiopulmonary Arrest, Order Elected:      [] Comfort Measures  [x] Limited Additional Interventions  [] Full Interventions    Artificially Administered Nutrition, Order Elected:    [x] No Feeding Tube   [] Feeding Tube for a defined trial period  [] Feeding Tube long-term if indicated    Meeting Outcomes:   [x] ACP discussion completed   [x] [place] form completed  [x] [place] prepared for Provider review and signature   [x] Original placed on Chart, if in facility (form to be sent with patient at discharge)  [x] Copy given to healthcare agent    [x] Copy scanned to electronic medical record    Sharath Gama LMSW  Palliative Medicine Inpatient   [address]: [phone])

## 2022-05-03 NOTE — PROGRESS NOTES
Problem: Falls - Risk of  Goal: *Absence of Falls  Description: Document Mahogany Hernandez Fall Risk and appropriate interventions in the flowsheet. Outcome: Progressing Towards Goal  Note: Fall Risk Interventions:  Mobility Interventions: Bed/chair exit alarm,Communicate number of staff needed for ambulation/transfer    Mentation Interventions: Bed/chair exit alarm,Door open when patient unattended,Adequate sleep, hydration, pain control,Evaluate medications/consider consulting pharmacy    Medication Interventions: Bed/chair exit alarm,Evaluate medications/consider consulting pharmacy    Elimination Interventions: Bed/chair exit alarm,Toileting schedule/hourly rounds    History of Falls Interventions: Bed/chair exit alarm,Consult care management for discharge planning,Door open when patient unattended,Evaluate medications/consider consulting pharmacy         Problem: Patient Education: Go to Patient Education Activity  Goal: Patient/Family Education  Outcome: Progressing Towards Goal     Problem: Pain  Goal: *Control of Pain  Outcome: Progressing Towards Goal  Goal: *PALLIATIVE CARE:  Alleviation of Pain  Outcome: Progressing Towards Goal     Problem: Patient Education: Go to Patient Education Activity  Goal: Patient/Family Education  Outcome: Progressing Towards Goal     Problem: Pressure Injury - Risk of  Goal: *Prevention of pressure injury  Description: Document Rio Scale and appropriate interventions in the flowsheet.   Outcome: Progressing Towards Goal  Note: Pressure Injury Interventions:  Sensory Interventions: Assess changes in LOC,Avoid rigorous massage over bony prominences,Check visual cues for pain,Discuss PT/OT consult with provider,Keep linens dry and wrinkle-free,Maintain/enhance activity level         Activity Interventions: Pressure redistribution bed/mattress(bed type)    Mobility Interventions: HOB 30 degrees or less,Pressure redistribution bed/mattress (bed type)    Nutrition Interventions: Document food/fluid/supplement intake                     Problem: Patient Education: Go to Patient Education Activity  Goal: Patient/Family Education  Outcome: Progressing Towards Goal     Problem: Patient Education: Go to Patient Education Activity  Goal: Patient/Family Education  Outcome: Progressing Towards Goal     Problem: Upper and Lower GI Bleed: Day 1  Goal: Off Pathway (Use only if patient is Off Pathway)  Outcome: Progressing Towards Goal  Goal: Activity/Safety  Outcome: Progressing Towards Goal  Goal: Consults, if ordered  Outcome: Progressing Towards Goal  Goal: Diagnostic Test/Procedures  Outcome: Progressing Towards Goal  Goal: Nutrition/Diet  Outcome: Progressing Towards Goal  Goal: Discharge Planning  Outcome: Progressing Towards Goal  Goal: Medications  Outcome: Progressing Towards Goal  Goal: Respiratory  Outcome: Progressing Towards Goal  Goal: Treatments/Interventions/Procedures  Outcome: Progressing Towards Goal  Goal: Psychosocial  Outcome: Progressing Towards Goal  Goal: *Optimal pain control at patient's stated goal  Outcome: Progressing Towards Goal  Goal: *Hemodynamically stable  Outcome: Progressing Towards Goal  Goal: *Demonstrates progressive activity  Outcome: Progressing Towards Goal     Problem: Upper and Lower GI Bleed: Day 2  Goal: Off Pathway (Use only if patient is Off Pathway)  Outcome: Progressing Towards Goal  Goal: Activity/Safety  Outcome: Progressing Towards Goal  Goal: Consults, if ordered  Outcome: Progressing Towards Goal  Goal: Diagnostic Test/Procedures  Outcome: Progressing Towards Goal  Goal: Nutrition/Diet  Outcome: Progressing Towards Goal  Goal: Discharge Planning  Outcome: Progressing Towards Goal  Goal: Medications  Outcome: Progressing Towards Goal  Goal: Respiratory  Outcome: Progressing Towards Goal  Goal: Treatments/Interventions/Procedures  Outcome: Progressing Towards Goal  Goal: Psychosocial  Outcome: Progressing Towards Goal  Goal: *Optimal pain control at patient's stated goal  Outcome: Progressing Towards Goal  Goal: *Hemodynamically stable  Outcome: Progressing Towards Goal  Goal: *Tolerating diet  Outcome: Progressing Towards Goal  Goal: *Demonstrates progressive activity  Outcome: Progressing Towards Goal     Problem: Upper and Lower GI Bleed: Day 3  Goal: Off Pathway (Use only if patient is Off Pathway)  Outcome: Progressing Towards Goal  Goal: Activity/Safety  Outcome: Progressing Towards Goal  Goal: Diagnostic Test/Procedures  Outcome: Progressing Towards Goal  Goal: Nutrition/Diet  Outcome: Progressing Towards Goal  Goal: Discharge Planning  Outcome: Progressing Towards Goal  Goal: Medications  Outcome: Progressing Towards Goal  Goal: Treatments/Interventions/Procedures  Outcome: Progressing Towards Goal  Goal: Psychosocial  Outcome: Progressing Towards Goal     Problem: Upper and Lower GI Bleed:  Discharge Outcomes  Goal: *Hemodynamically stable  Outcome: Progressing Towards Goal  Goal: *Lungs clear or at baseline  Outcome: Progressing Towards Goal  Goal: *Demonstrates independent activity or return to baseline  Outcome: Progressing Towards Goal  Goal: *Pain is controlled to three or less  Outcome: Progressing Towards Goal  Goal: *Verbalizes understanding and describes prescribed diet  Outcome: Progressing Towards Goal  Goal: *Tolerating diet  Outcome: Progressing Towards Goal  Goal: *Verbalizes name, dosage, time, side effects, and number of days to continue medications  Outcome: Progressing Towards Goal  Goal: *Anxiety reduced or absent  Outcome: Progressing Towards Goal  Goal: *Understands and describes signs and symptoms to report to providers(Stroke Metric)  Outcome: Progressing Towards Goal  Goal: *Describes follow-up/return visits to physicians  Outcome: Progressing Towards Goal  Goal: *Describes available resources and support systems  Outcome: Progressing Towards Goal     Problem: Patient Education: Go to Patient Education Activity  Goal: Patient/Family Education  Outcome: Progressing Towards Goal     Problem: Patient Education: Go to Patient Education Activity  Goal: Patient/Family Education  Outcome: Progressing Towards Goal

## 2022-05-03 NOTE — ROUTINE PROCESS
0710 - Bedside and Verbal shift change report given to [facility] (oncoming nurse) by Clari Escobar RN (offgoing nurse). Report included the following information SBAR, Kardex, Intake/Output, MAR and Recent Results. Patient sleeping in bed, bed in lowest position, bed alarm set, call bell in reach. 8590 - Family at the bedside requesting meeting with the . ANYA paged.     [phone] - Report given to [employer]

## 2022-05-03 NOTE — DISCHARGE SUMMARY
Transfer Summary    Patient: Vineet Latham MRN: [id number]  CSN: [id number]    YOB: 1927  Age: 80 y.o. Sex: male    DOA: 4/30/2022 LOS:  LOS: 2 days   Discharge Date: 5/3/2022     Disposition: Transfer to SNF    Admission Diagnoses: GI bleed [K92.2]    Discharge Diagnoses:    1. GI bleed, possible diverticular bleed versus ischemic. 2. Acute blood loss anemia  3. Prostate cancer with chronic Mahmood  4. Dementia  5. Diabetes type 2      Discharge Condition: Stable    PHYSICAL EXAM  Visit Vitals  BP (!) 95/48 (BP 1 Location: Left upper arm, BP Patient Position: At rest)   Pulse 75   Temp 98 °F (36.7 °C)   Resp 15   Wt 63 kg (139 lb)   SpO2 100%   BMI 20.53 kg/m²       General:  Alert, cooperative, no acute distress    Pulmonary:  CTA Bilaterally. No Wheezing/Rales. Cardiovascular: Regular rate and Rhythm. GI:  Soft, Non distended, Non tender. + Bowel sounds. Extremities:  No edema. No calf tenderness. Psych: Not anxious or agitated. Neurologic: Alert and oriented X 2. Moves all ext. Additional: Mahmood with clear urine                                Hospital Course:   Vineet Latham is a 80 y.o. male w/ PMH prostate cancer of unknown staging Per last urology note with chronic Mahmood, diabetes, CVA, likely some dementia who presented to the ED at [facility] from nursing facility with rectal bleeding. He was found to have hemoglobin of 6.9 on presentation there. He was transfused 1 unit of blood and was asked to be transferred here for further evaluation.     In the ED there, he was noted to have maroon stool. No significant vital sign changes including tachycardia or hypotension. No significant mental status changes. He had a CTA of his abdomen pelvis done which showed no obvious signs of active bleeding. Patient was admitted to hospital after unit of blood transfusion given in the ED. GI was consulted, PPI was started.   Patient did not have any further bleeding in the hospital, he had a normal bowel movement yesterday which was brown. Hemoglobin remained stable. GI saw the patient, recommended to monitor, given advanced age and no signs of active bleeding recommended no intervention needed. Patient remained stable in the hospital, did not have any further bleedings, hemoglobin remained stable. GI signed off on the patient, recommended okay for discharge. Patient has been seen by PT OT recommended going back to long-term care at nursing home. Palliative care has seen the patient in the hospital.  Patient is DNR and DNI. Patient is hemodynamically and medically stable for discharge back to long-term care today. Discussed with the patient and also with his wife at the bedside about discharge plan, follow-up appointments and also recommendations from GI. Wife at the bedside complete understands and agree with my plan. I also answered all her questions or concerns appropriate at the bedside. Procedures: None     Consults:   Dr. Michael Box, gastroenterology    Imaging studies:   CT Results (most recent):  Results from Hospital Encounter encounter on 04/30/22    CTA ABDOMEN PELV W CONT    Narrative  CTA Abdomen And Pelvis With Enhancement    INDICATION: Rectal bleed end abdominal aortic aneurysm. Prostate cancer. TECHNIQUE: 2.5 mm collimation axial images obtained from the diaphragm to the  level of the pubic symphysis prior to and following the uneventful  administration of nonionic intravenous contrast.  Imaging performed during  maximum aortic enhancement and is therefore suboptimal for evaluating solid  organs and bowel. Raw data reviewed along with three-dimensional volume  rendered images and maximum intensity projection images to better evaluate the  tortuous vascular structures.     All CT scans at this facility are performed using dose optimization technique as  appropriate to a performed exam, to include automated exposure control,  adjustment of the mA and/or kV according to patient size (including appropriate  matching first site-specific examinations), or use of iterative reconstruction  technique. COMPARISON: January 24, 2022. ABDOMEN FINDINGS:  There is suboptimal evaluation of visceral organs secondary to timing of the  exam.    Lung bases: Bibasilar atelectasis  Liver: No focal lesion. Spleen: Unremarkable. Pancreas: Unremarkable. Biliary: Unremarkable. Bowel: No evidence of bowel obstruction. No focal bowel wall thickening. There  is significant wall thickening of the rectum with associated stool burden and  adjacent inflammatory change. There is no active intraluminal GI bleed  identified. There is moderate stool burden. Appendix is normal  Peritoneum/ Retroperitoneum: No ascites or free air. Lymph Nodes: Left iliac lymph node measures 1.8 cm which is similar to prior. Left para-aortic lymph node is mildly improved from prior. Additional left iliac  lymph nodes are mildly improved from prior. Adrenal Glands: Unremarkable. Kidneys: Atrophic kidneys with scarring and small renal cysts. PELVIS FINDINGS:    Bladder/ Pelvic Organs: Mahmood catheter in the urinary bladder. Prostate is not  well assessed. Bones/Soft tissues: No acute finding. AORTA FINDINGS:  There is ectasia of the infrarenal aorta measuring up to 2.9 x 2.7 cm. There is  mild atherosclerotic disease. There is no aortic stenosis. Celiac artery is patent with moderate stenosis of its origin. SMA is patent with moderate stenosis proximally. SOURAV is patent. Left renal artery is patent with moderate stenosis proximally. Right renal artery is patent with severe stenosis proximally. Left iliac arteries are patent with atherosclerosis. Left femoral arteries are  patent with atherosclerosis. Right iliac arteries are patent with atherosclerosis. Right femoral arteries are  patent with atherosclerosis. Impression  No active intraluminal GI bleed identified.     Significant stool burden in the rectum with associated rectal wall thickening  suggesting secondary proctitis. Correlate clinically for fecal impaction. No other acute bowel abnormality elsewhere identified. Improving metastatic adenopathy in the retroperitoneum and left pelvis. Infrarenal abdominal aortic ectasia measuring up to 2.9 cm    Additional incidentals as above        Discharge Medications:     Current Discharge Medication List      START taking these medications    Details   pantoprazole (PROTONIX) 40 mg tablet Take 1 Tablet by mouth daily for 30 days. Qty: 30 Tablet, Refills: 0         CONTINUE these medications which have NOT CHANGED    Details   calcium carbonate (CALCIUM 300 PO) Take 1,000 mg by mouth.      polyethylene glycol (Miralax) 17 gram/dose powder Take 17 g by mouth daily. latanoprost (XALATAN) 0.005 % ophthalmic solution       Ca Cit-D3-K-Mgox-stron-sod bor (PROSTEON) 250 mg calcium -500 unit tab Take  by mouth. Cholecalciferol, Vitamin D3, (VITAMIN D3) 1,000 unit Cap Take  by mouth. vit c-kapmhvz-ibah-rutin-hb111 (BIOFLEX) 531-85-10-37 mg Tab Take  by mouth. ACETAMINOPHEN (TYLENOL ARTHRITIS PO) Take 2 Tabs by mouth daily. ASCORBATE CALCIUM (VITAMIN C PO) Take  by mouth. vit C/E/zinc ox/james/lut/zeax (ICAPS AREDS2 PO) Take  by mouth. atorvastatin (LIPITOR) 40 mg tablet Take  by mouth daily. cycloSPORINE (RESTASIS) 0.05 % ophthalmic emulsion Administer 1 Drop to both eyes two (2) times a day.          STOP taking these medications       aspirin 81 mg chewable tablet Comments:   Reason for Stopping:               DIET:  Regular Diet  Soft and bite sized diet with thin liquids   Meds float in puree  Aspiration precautions  HOB >45 degrees during all intake and for at least 30 min after intake  Small bites/sips, Feed slowly, alternating bites/sips   Oral care three times daily     ACTIVITY: Activity as tolerated  Patient needs to be on Fall, aspiration, decubitus precaution. ·    PT/OT consult  ·             Speech consult  ·  Mahmood Care per routine      ADDITIONAL INFORMATION: If you experience any of the following symptoms but not limited to Fever, chills, nausea, vomiting, diarrhea, change in mentation, falling, bleeding, shortness of breath, chest pain, please call your primary care physician or return to the emergency room if you cannot get hold of your doctor:     FOLLOW UP CARE:  Follow-up with 1. Physician at SNF in 1-2 days with Cbc with diff, bmp, mg.                             2. Dr. Patrizia Ingram, GI as needed     Pt's PCP: ARIANA Summers. Minutes spent on discharge: >40 minutes spent coordinating this discharge (review instructions/follow-up, prescriptions, preparing report for sign off)    Estelle Blandon MD  5/3/2022 11:12 AM    Disclaimer: Sections of this note are dictated using utilizing voice recognition software. Minor typographical errors may be present. If questions arise, please do not hesitate to contact me or call our department.

## 2022-05-03 NOTE — PROGRESS NOTES
Transition of Care Plan to LTC    LTC Transition:  Patient has been accepted back to [facility] and meets criteria for admission. Patient will  be transported by [facility] and expected to leave at 1300    Communication to Patient/Family:  Spoke with wife who is agreeable to the transition plan. Communication to SNF/Rehab:  Bedside RN, has been notified of the transition plan to the facility and to call report (phone number [phone]). Discharge information has been updated on the AVS. And communicated to facility via [facility], or CC link. SNF/Rehab Transition:    Nursing Please include all hard scripts for controlled substances, med rec and dc summary, and AVS in packet. Reviewed and confirmed with facility representative, Pauleen Kawasaki  at [facility] can manage the patient care needs for the following:     Mily Plascencia with (X) only those applicable:    COVID Status  Patient has had the Covid vaccine unknown. Medication:  [x]  Medications will be available at the facility  []  IV Antibiotics   []  Controlled Substance - hard copy to be sent with patient   []  Weekly Labs    Documents:  [] Hard RX  Number sent   [x] MAR  [] Kardex  [x] AVS  [] Wound care note  [x]Transfer Summary/Discharge Summary    Equipment:  []  CPAP/BiPAP  []  Wound Vacuum  [x]  Mahmood or Urinary Device  []  PICC/Central Line  []  Nebulizer  []  Ventilator    Treatment:  []Isolation (for MRSA, VRE, etc.)  []Surgical Drain Management  []Tracheostomy Care  []Dressing Changes  []Dialysis with transportation and chair time  Center Mode of tranpsort   []PEG Care  []Oxygen  []Daily Weights for Heart Failure    Dietary:  []Any diet limitations  []Tube Feedings   []Total Parenteral Management (TPN)    Eligible for Medicaid Long Term Services and Supports  Yes:  [] Eligible for medical assistance or will become eligible within 180 days and LTSS completed. [] Provider/Patient and/or support system has requested screening.   [] LTSS copy provided to patient or responsible party,   [] LTSS unavailable at discharge will send once processed to SNF provider.  [] LTSS  unavailable at discharged mailed to patient  [] LTSS performed by outside agency  onwith tracking number  No:   [] Private pay and is not financially eligible for Medicaid within the next 180 days. [] Reside out-of-state.   [] A residents of a state owned/operated facility that is licensed  by [address] [employer] [facility] or [facility]  [] Enrollment in [facility] hospice services  [] [address] [place] [address]  [] Patient /Family declines to have screening completed or provide financial information for screening          Financial Resources:  Medicaid    [] Initiated and application pending   [] Full coverage      Advanced Care Plan:  []Surrogate Decision Maker of Care  []POA  [x]Communicated Code Status/ sent DDNR/POST form     Other  Patient Sandra Woodward believes patient is on a spend down for [place]

## 2022-05-03 NOTE — ROUTINE PROCESS
Bedside shift change report given to Abhishek Lewis (oncoming nurse) by Nelida Guadalupe RN (offgoing nurse). Report included the following information SBAR, Kardex, Intake/Output, MAR and Recent Results.

## 2022-05-13 NOTE — PROGRESS NOTES
Physician Progress Note      PATIENT:               Leslie Nesbitt  CSN #:                  [id number]  :                       3/26/1927  ADMIT DATE:       2022 11:49 PM  [address] [place] DATE:        5/3/2022 3:06 PM  RESPONDING  PROVIDER #:        Brendan Olvera MD          QUERY TEXT:    Pt admitted with GIB. Noted documentation of  improving metastatic adenopathy in retroperitoneum  in CT abdomen on . If possible, please document in progress notes and discharge summary:    The medical record reflects the following:  Risk Factors: 81 yo male with PMH prostate cancer    Clinical Indicators:  CT scan abdomen Improving metastatic adenopathy in the retroperitoneum and left pelvis    Treatment: CT scan abdomen      Mitzi ORDOÑEZN, RN, [address] [place] [address]. [address]  C: [phone]    [email or web address]  Options provided:  -- CT with improving metastatic adenopathy in retroperitoneum  not clinically significant  -- CT with improving metastatic adenopathy in retroperitoneum  is clinically significant  -- Other - I will add my own diagnosis  -- Disagree - Not applicable / Not valid  -- Disagree - Clinically unable to determine / Unknown  -- Refer to Clinical Documentation Reviewer    PROVIDER RESPONSE TEXT:     CT scan abdomen Improving metastatic adenopathy in the retroperitoneum and left pelvis, not relevant with current admission.  Oncology follow up    Query created by: Alessandro Bradshaw on 2022 6:40 PM      Electronically signed by:  Brendan Olvera MD 2022 2:44 PM

## 2024-02-01 NOTE — ROUTINE PROCESS
0400 pt arrived to the unit    0410 pt unable to answer admission questions    0700 Bedside shift change report given to [address] (oncoming nurse) by Shereen Ingram RN (offgoing nurse). Report included the following information SBAR, Kardex, Intake/Output, MAR and Cardiac Rhythm NSR/S. Tach. Yes

## (undated) DEVICE — (D)GLOVE EXAM LG NITRL NS -- DISC BY MFR NO SUB

## (undated) DEVICE — FLEX ADVANTAGE 3000CC: Brand: FLEX ADVANTAGE

## (undated) DEVICE — MEDI-VAC NON-CONDUCTIVE SUCTION TUBING: Brand: CARDINAL HEALTH

## (undated) DEVICE — SYRINGE MED 25GA 3ML L5/8IN SUBQ PLAS W/ DETACH NDL SFTY

## (undated) DEVICE — AIRLIFE™ NASAL OXYGEN CANNULA CURVED, NONFLARED TIP WITH 14 FOOT (4.3 M) CRUSH-RESISTANT TUBING, OVER-THE-EAR STYLE: Brand: AIRLIFE™

## (undated) DEVICE — CATHETER SUCT TR FL TIP 14FR W/ O CTRL

## (undated) DEVICE — GAUZE SPONGES,16 PLY: Brand: CURITY

## (undated) DEVICE — SYRINGE MED 20ML STD CLR PLAS LUERLOCK TIP N CTRL DISP

## (undated) DEVICE — GOWN ISOL IMPERV UNIV, DISP, OPEN BACK, BLUE --

## (undated) DEVICE — SYR 50ML SLIP TIP NSAF LF STRL --

## (undated) DEVICE — SOLUTION IRRIG 1000ML H2O STRL BLT

## (undated) DEVICE — FLUFF AND POLYMER UNDERPAD,EXTRA HEAVY: Brand: WINGS

## (undated) DEVICE — (D)SYR 10ML 1/5ML GRAD NSAF -- PKGING CHANGE USE ITEM 338027

## (undated) DEVICE — CANNULA ORIG TL CLR W FOAM CUSHIONS AND 14FT SUPL TB 3 CHN

## (undated) DEVICE — MEDI-VAC SUCTION HIGH CAPACITY: Brand: CARDINAL HEALTH

## (undated) DEVICE — ENDOSCOPY PUMP TUBING/ CAP SET: Brand: ERBE